# Patient Record
Sex: FEMALE | Race: WHITE | NOT HISPANIC OR LATINO
[De-identification: names, ages, dates, MRNs, and addresses within clinical notes are randomized per-mention and may not be internally consistent; named-entity substitution may affect disease eponyms.]

---

## 2024-10-25 ENCOUNTER — APPOINTMENT (OUTPATIENT)
Dept: NEUROLOGY | Facility: CLINIC | Age: 79
End: 2024-10-25

## 2024-10-25 ENCOUNTER — INPATIENT (INPATIENT)
Facility: HOSPITAL | Age: 79
LOS: 3 days | Discharge: HOME HEALTH | End: 2024-10-29
Attending: HOSPITALIST | Admitting: STUDENT IN AN ORGANIZED HEALTH CARE EDUCATION/TRAINING PROGRAM
Payer: MEDICARE

## 2024-10-25 VITALS
TEMPERATURE: 98 F | RESPIRATION RATE: 16 BRPM | OXYGEN SATURATION: 94 % | SYSTOLIC BLOOD PRESSURE: 106 MMHG | WEIGHT: 139.99 LBS | DIASTOLIC BLOOD PRESSURE: 73 MMHG | HEART RATE: 73 BPM

## 2024-10-25 DIAGNOSIS — F03.90 UNSPECIFIED DEMENTIA, UNSPECIFIED SEVERITY, WITHOUT BEHAVIORAL DISTURBANCE, PSYCHOTIC DISTURBANCE, MOOD DISTURBANCE, AND ANXIETY: ICD-10-CM

## 2024-10-25 DIAGNOSIS — I48.20 CHRONIC ATRIAL FIBRILLATION, UNSPECIFIED: ICD-10-CM

## 2024-10-25 DIAGNOSIS — E03.9 HYPOTHYROIDISM, UNSPECIFIED: ICD-10-CM

## 2024-10-25 DIAGNOSIS — N17.9 ACUTE KIDNEY FAILURE, UNSPECIFIED: ICD-10-CM

## 2024-10-25 DIAGNOSIS — N39.0 URINARY TRACT INFECTION, SITE NOT SPECIFIED: ICD-10-CM

## 2024-10-25 DIAGNOSIS — D75.89 OTHER SPECIFIED DISEASES OF BLOOD AND BLOOD-FORMING ORGANS: ICD-10-CM

## 2024-10-25 DIAGNOSIS — Z29.9 ENCOUNTER FOR PROPHYLACTIC MEASURES, UNSPECIFIED: ICD-10-CM

## 2024-10-25 DIAGNOSIS — I10 ESSENTIAL (PRIMARY) HYPERTENSION: ICD-10-CM

## 2024-10-25 DIAGNOSIS — M79.671 PAIN IN RIGHT FOOT: ICD-10-CM

## 2024-10-25 DIAGNOSIS — D46.9 MYELODYSPLASTIC SYNDROME, UNSPECIFIED: ICD-10-CM

## 2024-10-25 PROBLEM — I48.91 UNSPECIFIED ATRIAL FIBRILLATION: Chronic | Status: ACTIVE | Noted: 2024-03-23

## 2024-10-25 LAB
ALBUMIN SERPL ELPH-MCNC: 3.6 G/DL — SIGNIFICANT CHANGE UP (ref 3.4–5)
ALP SERPL-CCNC: 95 U/L — SIGNIFICANT CHANGE UP (ref 40–120)
ALT FLD-CCNC: 13 U/L — SIGNIFICANT CHANGE UP (ref 12–42)
ANION GAP SERPL CALC-SCNC: 8 MMOL/L — LOW (ref 9–16)
ANISOCYTOSIS BLD QL: SIGNIFICANT CHANGE UP
APPEARANCE UR: CLEAR — SIGNIFICANT CHANGE UP
AST SERPL-CCNC: 13 U/L — LOW (ref 15–37)
BACTERIA # UR AUTO: ABNORMAL /HPF
BASOPHILS # BLD AUTO: 0.06 K/UL — SIGNIFICANT CHANGE UP (ref 0–0.2)
BASOPHILS NFR BLD AUTO: 0.3 % — SIGNIFICANT CHANGE UP (ref 0–2)
BILIRUB SERPL-MCNC: 1.3 MG/DL — HIGH (ref 0.2–1.2)
BILIRUB UR-MCNC: NEGATIVE — SIGNIFICANT CHANGE UP
BUN SERPL-MCNC: 26 MG/DL — HIGH (ref 7–23)
CALCIUM SERPL-MCNC: 9 MG/DL — SIGNIFICANT CHANGE UP (ref 8.5–10.5)
CHLORIDE SERPL-SCNC: 101 MMOL/L — SIGNIFICANT CHANGE UP (ref 96–108)
CO2 SERPL-SCNC: 27 MMOL/L — SIGNIFICANT CHANGE UP (ref 22–31)
COLOR SPEC: YELLOW — SIGNIFICANT CHANGE UP
CREAT SERPL-MCNC: 1.69 MG/DL — HIGH (ref 0.5–1.3)
DIFF PNL FLD: ABNORMAL
EGFR: 30 ML/MIN/1.73M2 — LOW
EOSINOPHIL # BLD AUTO: 0.06 K/UL — SIGNIFICANT CHANGE UP (ref 0–0.5)
EOSINOPHIL NFR BLD AUTO: 0.3 % — SIGNIFICANT CHANGE UP (ref 0–6)
EPI CELLS # UR: PRESENT
GLUCOSE SERPL-MCNC: 112 MG/DL — HIGH (ref 70–99)
GLUCOSE UR QL: NEGATIVE MG/DL — SIGNIFICANT CHANGE UP
HCT VFR BLD CALC: 41.9 % — SIGNIFICANT CHANGE UP (ref 34.5–45)
HGB BLD-MCNC: 14.3 G/DL — SIGNIFICANT CHANGE UP (ref 11.5–15.5)
IMM GRANULOCYTES NFR BLD AUTO: 0.7 % — SIGNIFICANT CHANGE UP (ref 0–0.9)
KETONES UR-MCNC: 15 MG/DL
LACTATE BLDV-MCNC: 1.7 MMOL/L — SIGNIFICANT CHANGE UP (ref 0.5–2)
LEUKOCYTE ESTERASE UR-ACNC: ABNORMAL
LYMPHOCYTES # BLD AUTO: 0.9 K/UL — LOW (ref 1–3.3)
LYMPHOCYTES # BLD AUTO: 4.6 % — LOW (ref 13–44)
MACROCYTES BLD QL: SIGNIFICANT CHANGE UP
MANUAL SMEAR VERIFICATION: SIGNIFICANT CHANGE UP
MCHC RBC-ENTMCNC: 34.1 GM/DL — SIGNIFICANT CHANGE UP (ref 32–36)
MCHC RBC-ENTMCNC: 37.7 PG — HIGH (ref 27–34)
MCV RBC AUTO: 110.6 FL — HIGH (ref 80–100)
MONOCYTES # BLD AUTO: 1.05 K/UL — HIGH (ref 0–0.9)
MONOCYTES NFR BLD AUTO: 5.3 % — SIGNIFICANT CHANGE UP (ref 2–14)
NEUTROPHILS # BLD AUTO: 17.55 K/UL — HIGH (ref 1.8–7.4)
NEUTROPHILS NFR BLD AUTO: 88.8 % — HIGH (ref 43–77)
NITRITE UR-MCNC: POSITIVE
NRBC # BLD: 0 /100 WBCS — SIGNIFICANT CHANGE UP (ref 0–0)
PH UR: 5.5 — SIGNIFICANT CHANGE UP (ref 5–8)
PLAT MORPH BLD: NORMAL — SIGNIFICANT CHANGE UP
PLATELET # BLD AUTO: 301 K/UL — SIGNIFICANT CHANGE UP (ref 150–400)
POTASSIUM SERPL-MCNC: 4.5 MMOL/L — SIGNIFICANT CHANGE UP (ref 3.5–5.3)
POTASSIUM SERPL-SCNC: 4.5 MMOL/L — SIGNIFICANT CHANGE UP (ref 3.5–5.3)
PROT SERPL-MCNC: 7.1 G/DL — SIGNIFICANT CHANGE UP (ref 6.4–8.2)
PROT UR-MCNC: 100 MG/DL
RBC # BLD: 3.79 M/UL — LOW (ref 3.8–5.2)
RBC # FLD: 18.4 % — HIGH (ref 10.3–14.5)
RBC BLD AUTO: ABNORMAL
RBC CASTS # UR COMP ASSIST: 3 /HPF — SIGNIFICANT CHANGE UP (ref 0–4)
SODIUM SERPL-SCNC: 136 MMOL/L — SIGNIFICANT CHANGE UP (ref 132–145)
SP GR SPEC: 1.01 — SIGNIFICANT CHANGE UP (ref 1–1.03)
UROBILINOGEN FLD QL: 0.2 MG/DL — SIGNIFICANT CHANGE UP (ref 0.2–1)
WBC # BLD: 19.76 K/UL — HIGH (ref 3.8–10.5)
WBC # FLD AUTO: 19.76 K/UL — HIGH (ref 3.8–10.5)
WBC UR QL: 99 /HPF — HIGH (ref 0–5)

## 2024-10-25 PROCEDURE — 99285 EMERGENCY DEPT VISIT HI MDM: CPT

## 2024-10-25 PROCEDURE — 99223 1ST HOSP IP/OBS HIGH 75: CPT | Mod: GC

## 2024-10-25 PROCEDURE — 71045 X-RAY EXAM CHEST 1 VIEW: CPT | Mod: 26

## 2024-10-25 RX ORDER — MELATONIN 5 MG
3 TABLET ORAL AT BEDTIME
Refills: 0 | Status: DISCONTINUED | OUTPATIENT
Start: 2024-10-25 | End: 2024-10-29

## 2024-10-25 RX ORDER — MELATONIN 5 MG
1 TABLET ORAL
Refills: 0 | DISCHARGE

## 2024-10-25 RX ORDER — HYDROXYUREA 500 MG
500 CAPSULE ORAL
Refills: 0 | Status: DISCONTINUED | OUTPATIENT
Start: 2024-10-25 | End: 2024-10-29

## 2024-10-25 RX ORDER — SODIUM CHLORIDE 9 MG/ML
1000 INJECTION, SOLUTION INTRAMUSCULAR; INTRAVENOUS; SUBCUTANEOUS ONCE
Refills: 0 | Status: COMPLETED | OUTPATIENT
Start: 2024-10-25 | End: 2024-10-25

## 2024-10-25 RX ORDER — CEFTRIAXONE SODIUM 10 G
1000 VIAL (EA) INJECTION ONCE
Refills: 0 | Status: COMPLETED | OUTPATIENT
Start: 2024-10-25 | End: 2024-10-25

## 2024-10-25 RX ORDER — ACETAMINOPHEN 500 MG
1000 TABLET ORAL EVERY 8 HOURS
Refills: 0 | Status: DISCONTINUED | OUTPATIENT
Start: 2024-10-25 | End: 2024-10-29

## 2024-10-25 RX ORDER — RIVAROXABAN 20 MG/1
1 TABLET, FILM COATED ORAL
Refills: 0 | DISCHARGE

## 2024-10-25 RX ORDER — LEVOTHYROXINE SODIUM 88 MCG
88 TABLET ORAL DAILY
Refills: 0 | Status: DISCONTINUED | OUTPATIENT
Start: 2024-10-25 | End: 2024-10-29

## 2024-10-25 RX ORDER — RIVAROXABAN 20 MG/1
15 TABLET, FILM COATED ORAL
Refills: 0 | Status: DISCONTINUED | OUTPATIENT
Start: 2024-10-25 | End: 2024-10-29

## 2024-10-25 RX ORDER — NIFEDIPINE 90 MG
1 TABLET, EXTENDED RELEASE 24 HR ORAL
Refills: 0 | DISCHARGE

## 2024-10-25 RX ORDER — ACETAMINOPHEN 500 MG
650 TABLET ORAL ONCE
Refills: 0 | Status: COMPLETED | OUTPATIENT
Start: 2024-10-25 | End: 2024-10-25

## 2024-10-25 RX ORDER — NIFEDIPINE 90 MG
30 TABLET, EXTENDED RELEASE 24 HR ORAL DAILY
Refills: 0 | Status: DISCONTINUED | OUTPATIENT
Start: 2024-10-25 | End: 2024-10-26

## 2024-10-25 RX ORDER — QUETIAPINE FUMARATE 200 MG/1
25 TABLET ORAL AT BEDTIME
Refills: 0 | Status: DISCONTINUED | OUTPATIENT
Start: 2024-10-25 | End: 2024-10-29

## 2024-10-25 RX ORDER — ONDANSETRON HYDROCHLORIDE 2 MG/ML
4 INJECTION, SOLUTION INTRAMUSCULAR; INTRAVENOUS EVERY 8 HOURS
Refills: 0 | Status: DISCONTINUED | OUTPATIENT
Start: 2024-10-25 | End: 2024-10-29

## 2024-10-25 RX ORDER — CEFTRIAXONE SODIUM 10 G
2000 VIAL (EA) INJECTION EVERY 24 HOURS
Refills: 0 | Status: DISCONTINUED | OUTPATIENT
Start: 2024-10-26 | End: 2024-10-26

## 2024-10-25 RX ORDER — METOPROLOL TARTRATE 50 MG
100 TABLET ORAL DAILY
Refills: 0 | Status: DISCONTINUED | OUTPATIENT
Start: 2024-10-25 | End: 2024-10-26

## 2024-10-25 RX ORDER — MAGNESIUM, ALUMINUM HYDROXIDE 200-200 MG
30 TABLET,CHEWABLE ORAL EVERY 4 HOURS
Refills: 0 | Status: DISCONTINUED | OUTPATIENT
Start: 2024-10-25 | End: 2024-10-29

## 2024-10-25 RX ORDER — CEFTRIAXONE SODIUM 10 G
1000 VIAL (EA) INJECTION ONCE
Refills: 0 | Status: DISCONTINUED | OUTPATIENT
Start: 2024-10-25 | End: 2024-10-26

## 2024-10-25 RX ADMIN — Medication 1000 MILLIGRAM(S): at 12:59

## 2024-10-25 RX ADMIN — RIVAROXABAN 15 MILLIGRAM(S): 20 TABLET, FILM COATED ORAL at 21:57

## 2024-10-25 RX ADMIN — SODIUM CHLORIDE 1000 MILLILITER(S): 9 INJECTION, SOLUTION INTRAMUSCULAR; INTRAVENOUS; SUBCUTANEOUS at 11:16

## 2024-10-25 RX ADMIN — Medication 100 MILLIGRAM(S): at 12:29

## 2024-10-25 RX ADMIN — Medication 3 MILLIGRAM(S): at 21:56

## 2024-10-25 RX ADMIN — SODIUM CHLORIDE 2000 MILLILITER(S): 9 INJECTION, SOLUTION INTRAMUSCULAR; INTRAVENOUS; SUBCUTANEOUS at 15:16

## 2024-10-25 RX ADMIN — Medication 650 MILLIGRAM(S): at 12:28

## 2024-10-25 RX ADMIN — Medication 650 MILLIGRAM(S): at 15:11

## 2024-10-25 RX ADMIN — SODIUM CHLORIDE 1000 MILLILITER(S): 9 INJECTION, SOLUTION INTRAMUSCULAR; INTRAVENOUS; SUBCUTANEOUS at 12:33

## 2024-10-25 RX ADMIN — QUETIAPINE FUMARATE 25 MILLIGRAM(S): 200 TABLET ORAL at 21:57

## 2024-10-25 NOTE — H&P ADULT - PROBLEM SELECTOR PLAN 3
home medication(s): xarelto 15mg qd, metoprolol 100qd    - continue home medication(s) plan as above

## 2024-10-25 NOTE — H&P ADULT - PROBLEM SELECTOR PLAN 12
F: none  E: replete as needed  N: dash diet  VTE ppx: heparin sq  GI ppx: none  Dispo: RMF  Code status: DNR/DNI

## 2024-10-25 NOTE — H&P ADULT - PROBLEM SELECTOR PLAN 11
chronic, trialed on gabapentin, recently referred to podiatry (unclear whether pt has been able to follow up)  home medication(s): tylenol 1000mg bid    - continue tylenol as above

## 2024-10-25 NOTE — ED PROVIDER NOTE - PHYSICAL EXAMINATION
Gen: NAD. HEENT: NCAT, mmm   Chest: RRR, nl S1 and S2, no m/r/g. Resp: CTAB, no w/r/r  Abd: nl BS, soft, nt/nd. Ext: Warm, dry  Neuro: CN II-XII intact, normal and equal strength, sensation, and reflexes bilaterally, speech clear

## 2024-10-25 NOTE — H&P ADULT - PROBLEM SELECTOR PLAN 10
F: none  E: replete as needed  N: dash diet  VTE ppx: heparin sq  GI ppx: none  Dispo: RMF  Code status: DNR/DNI mri 2022 moderate chronic microvascular ischemic disease, chronic lacunar infarcts in the right enctrum semiovrale and left cerbellar hemisphere  behavioral sx worse at night, insomnia, confusion, aggression  24/7 hha, hcp jesica bourgeois ([phone])  seroquel qhs dose recently increased from 12.5 to 25 qhs w notable improvement in sx above    - continue seroquel 25mg qhs  - delirium precautions (high risk for delirium given advanced age, dementia, cognitive impairment, frailty, history of cns disorders - bedside window with blinds open, frequent re-orientation, minimize lines nighttime awakenings, ensure regular bowel movements w bowel regimen)

## 2024-10-25 NOTE — H&P ADULT - ASSESSMENT
79F PMHx dementia, afib, htn, hypothyroidism, jak2 mds, who presented to ed w [facility] after pt was noted to be febrile at home (not measured). per hha & hcp discussions w ed provider, pt was diagnosed w uti few weeks ago, sp tx w po abx (unable to recall which one). pt herself is unable to communicate sx due to baseline mental status iso dementia. hha reports noting increased fatigue & urinary frequency in the day preceding admission, sx similar to prior instance of uti, no obvious complaints of discomfort. 79F PMHx dementia, afib, htn, hypothyroidism, jak2 mds, who presented to ed w hha after pt was noted to be febrile at home (not measured). per hha & hcp discussions w ed provider, pt was diagnosed w uti few weeks ago, sp tx w po macrobid. pt herself is unable to communicate sx due to baseline mental status iso dementia. hha reports noting increased fatigue & urinary frequency in the day preceding admission, sx similar to prior instance of uti, no obvious complaints of discomfort.

## 2024-10-25 NOTE — H&P ADULT - PROBLEM SELECTOR PLAN 8
mri 2022 moderate chronic microvascular ischemic disease, chronic lacunar infarcts in the right enctrum semiovrale and left cerbellar hemisphere  behavioral sx worse at night, insomnia, confusion, aggression  24/7 hha, hcp jesica bourgeois ([phone])  seroquel qhs dose recently increased from 12.5 to 25 qhs w notable improvement in sx above    - continue seroquel 25mg qhs  - delirium precautions (high risk for delirium given advanced age, dementia, cognitive impairment, frailty, history of cns disorders - bedside window with blinds open, frequent re-orientation, minimize lines nighttime awakenings, ensure regular bowel movements w bowel regimen) Jak2+ myleoproliferative syndrome  follows w dr. kerr every 6mo, recently resumed hydroxyurea  chronic leukocytosis, thrombocytosis (plts wnl at presentation)  home medication(s): hydroxyurea 500mg bid    - continue home medication(s)

## 2024-10-25 NOTE — H&P ADULT - PROBLEM SELECTOR PLAN 9
chronic, trialed on gabapentin, recently referred to podiatry (unclear whether pt has been able to follow up)  home medication(s): tylenol 1000mg bid    - continue tylenol as above mcv 110.6 at presentation, baseline 90  hx of mds, hypothyroidism, elderly w dementia & possible nutrient deficiency    - follow up am tsh, b12, folate

## 2024-10-25 NOTE — H&P ADULT - NSICDXPASTMEDICALHX_GEN_ALL_CORE_FT
PAST MEDICAL HISTORY:  Atrial fibrillation     Atrial fibrillation     Dementia     HTN (hypertension)     Hypertension     Hypothyroid     Hypothyroidism     MDS (myelodysplastic syndrome)

## 2024-10-25 NOTE — H&P ADULT - PROBLEM SELECTOR PLAN 1
per hha, 1d fatigue, increased urinary urgency, febrile overnight (not measured)  recent uti sp po abx, similar presenting sx per hha (unable to recall abx)  sp ctx, 2l ivf in ed  +ua, leukocytosis close to baseline    - ctx 2g qd x3d (10/25 - 10/27)  - follow up urine cx, sensitivities 2/4 sirs criteria (fever, leukocytosis - though, of note, wbc close to baseline), likely source uti  sp ctx 1g in ed, 2l ivf, tylenol  uti sx x2+ weeks, recently completed treatment w macrobid outpatient  bcx collected in ed +gram negative rods - likely source genitourinary tract    - continue ctx 2g qd for now (ordered for 8d course), narrow as appropriate  - follow up ctap  - follow up ucx, sensitivities  - daily surveillance bcx  - hold home metoprolol, nifedipine for now

## 2024-10-25 NOTE — H&P ADULT - NSHPPHYSICALEXAM_GEN_ALL_CORE
General: NAD  HEENT: PERRL, EOM, anicteric sclera, MMM  Cardiovascular: +S1/S2; RRR; no M/R/G  Respiratory: CTAB; no W/R/R  Gastrointestinal: soft, NT/ND; +BS x4  Extremities: WWP; 2+ peripheral pulses bilaterally; no LE edema  Skin: normal color and turgor; no rash  Neurologic: AOx1 General: NAD  HEENT: PERRL, EOM, anicteric sclera, MMM  Cardiovascular: +S1/S2; RRR; no M/R/G  Respiratory: CTAB; no W/R/R  Gastrointestinal: soft, NT/ND; +BS x4; no suprapubic tenderness, no flank tenderness  Extremities: WWP; 2+ peripheral pulses bilaterally; no LE edema  Skin: normal color and turgor; no rash  Neurologic: AOx1

## 2024-10-25 NOTE — H&P ADULT - PROBLEM SELECTOR PLAN 6
Jak2+ myleoproliferative syndrome  follows w dr. kerr every 6mo, recently resumed hydroxyurea  chronic leukocytosis, thrombocytosis (plts wnl at presentation)  home medication(s): hydroxyurea 500mg bid    - continue home medication(s) home medication(s): metoprolol 100mg qd, nifedipine 30mg qd    - hold home medications for now given acute sepsis, resume as appropriate

## 2024-10-25 NOTE — H&P ADULT - PROBLEM SELECTOR PLAN 2
cr at presentation 1.69, baseline around 1, iso acute uti    - continue to trend creatinine, avoid nephrotoxins, renally dose medications plan as above

## 2024-10-25 NOTE — H&P ADULT - ATTENDING COMMENTS
79F PMHx dementia, afib, htn, hypothyroidism, jak2 mds, who presented to ed w [facility] after pt was noted to be febrile at home, admitted for sepsis 2/2 UTI and gram negative bacteremia     #Sepsis   #UTI   #gram negative bacteremia   #GUERRERO  #Dementia     Plan   -c/w Ceftriaxone 2g QD, no hx of MDR UTIs, HDS   -maintenance fluids  -f/up urine cx. bcx speciation. surveillance bcx   -trend cr s/p fluids. f/up bladder scan, urine lytes   -CTAP   -PT/SW  -fall precautions

## 2024-10-25 NOTE — H&P ADULT - PROBLEM SELECTOR PLAN 5
home medication(s): levothyroxine 88mcg - 1 tab tuesday - friday, sunday; 2 tabs monday & saturday    - continue home medication(s) home medication(s): xarelto 15mg qd, metoprolol 100qd    - continue home xarelto, hold metoprolol for now, resume as appropriate home medication(s): xarelto 15mg qd, metoprolol 100qd    - continue home xarelto  - hold metoprolol for now, resume as appropriate

## 2024-10-25 NOTE — ED PROVIDER NOTE - OBJECTIVE STATEMENT
Patient's home health aides report that patient had a UTI last week and was treated with an unknown antibiotic.  Patient developed fever around 2 AM today, not measured.  Patient unable to give review of systems due to dementia.

## 2024-10-25 NOTE — ED PROVIDER NOTE - PROGRESS NOTE DETAILS
Spoke with patient's healthcare proxy Maile Nickerson [phone]. She said patient was treated with antibiotics 3 weeks ago, though she does not know which one. She said patient's wishes are to be DNR/DNI. Patient is resting comfortably, NAD. I consulted with Dr. Hooker who accepted patient to [facility].

## 2024-10-25 NOTE — ED ADULT TRIAGE NOTE - CHIEF COMPLAINT QUOTE
PT bib EMS c/o fever, generalized fatigue, and urinary frequency x yesterday. EMS reports being dx with a UTI last week. PMH HTN, Afib, and dementia.

## 2024-10-25 NOTE — ED ADULT NURSE NOTE - NSFALLRISKINTERV_ED_ALL_ED
Assistance OOB with selected safe patient handling equipment if applicable/Assistance with ambulation/Communicate fall risk and risk factors to all staff, patient, and family/Monitor gait and stability/Monitor for mental status changes and reorient to person, place, and time, as needed/Provide visual cue: yellow wristband, yellow gown, etc/Reinforce activity limits and safety measures with patient and family/Toileting schedule using arm’s reach rule for commode and bathroom/Use of alarms - bed, stretcher, chair and/or video monitoring/Call bell, personal items and telephone in reach/Instruct patient to call for assistance before getting out of bed/chair/stretcher/Non-slip footwear applied when patient is off stretcher/[place] to call system/Physically safe environment - no spills, clutter or unnecessary equipment/Purposeful Proactive Rounding/Room/bathroom lighting operational, light cord in reach

## 2024-10-25 NOTE — H&P ADULT - HISTORY OF PRESENT ILLNESS
79F PMHx dementia, afib, htn, hypothyroidism, jak2 mds, who presented to ed w [facility] after pt was noted to be febrile at home (not measured). per hha & hcp discussions w ed provider, pt was diagnosed w uti few weeks ago, sp tx w po abx (unable to recall which one). pt herself is unable to communicate sx due to baseline mental status iso dementia. hha reports noting increased fatigue & urinary frequency in the day preceding admission, sx similar to prior instance of uti, no obvious complaints of discomfort.    ED Course -  Presenting vitals: 98.3F, 106/73, HR 73, RR 16, spo2 94% on room air  Notable labs: wbc 19.76 (neutrophil-predominant), mcv 110.6, creatinine 1.69, ua + (protein, ketones, moderate leukocyte esterase, nitrite, wbc, bacteria)  Imaging: clear lungs on cxr  Interventions: ctx, tylenol x1, 2l ns 79F PMHx dementia, afib, htn, hypothyroidism, jak2 mds, who presented to ed w [facility]ha after pt was noted to be febrile at home (not measured). per hha & hcp discussions w ed provider, pt was diagnosed w uti few weeks ago, sp tx w macrobid. pt herself is unable to communicate sx due to baseline mental status iso dementia. hha reports noting increased fatigue & urinary frequency in the day preceding admission, sx similar to prior instance of uti, no obvious complaints of discomfort.    ED Course -  Vitals: 102F (Tmax, rectal), 106/73, HR 73, RR 16, spo2 94% on room air  Notable labs: wbc 19.76 (neutrophil-predominant), mcv 110.6, creatinine 1.69, ua + (protein, ketones, moderate leukocyte esterase, nitrite, wbc, bacteria)  Imaging: clear lungs on cxr  Interventions: ctx, tylenol x1, 2l ns

## 2024-10-25 NOTE — H&P ADULT - PROBLEM SELECTOR PLAN 7
mcv 110.6 at presentation, baseline 90  hx of mds, hypothyroidism, elderly w dementia & possible nutrient deficiency    - follow up am tsh, b12, folate home medication(s): levothyroxine 88mcg - 1 tab tuesday - friday, sunday; 2 tabs monday & saturday    - continue home medication(s)

## 2024-10-25 NOTE — H&P ADULT - PROBLEM SELECTOR PLAN 4
home medication(s): metoprolol 100mg qd, nifedipine 30mg qd    - continue home medication(s) cr at presentation 1.69, baseline around 1, iso acute infection  no cva tenderness on exam    - maintenance fluids at 100cc/h x10 hours (to complete 10/26 am)  - continue to trend creatinine, avoid nephrotoxins, renally dose medications  - follow up ctap as above (avoid iv contrast, consider if clarita improved in am & study still pending) cr at presentation 1.69, baseline around 1, likely prerenal vs less-likely intrinsic due to pyelonephritis  no cva tenderness on exam    - maintenance fluids at 100cc/h x10 hours (to complete 10/26 am)  - continue to trend creatinine, avoid nephrotoxins, renally dose medications  - follow up ctap as above (avoid iv contrast, consider if clarita improved in am & study still pending)

## 2024-10-26 DIAGNOSIS — A41.9 SEPSIS, UNSPECIFIED ORGANISM: ICD-10-CM

## 2024-10-26 DIAGNOSIS — R78.81 BACTEREMIA: ICD-10-CM

## 2024-10-26 LAB
ANION GAP SERPL CALC-SCNC: 12 MMOL/L — SIGNIFICANT CHANGE UP (ref 5–17)
APPEARANCE UR: CLEAR — SIGNIFICANT CHANGE UP
BACTERIA # UR AUTO: NEGATIVE /HPF — SIGNIFICANT CHANGE UP
BASOPHILS # BLD AUTO: 0.03 K/UL — SIGNIFICANT CHANGE UP (ref 0–0.2)
BASOPHILS NFR BLD AUTO: 0.2 % — SIGNIFICANT CHANGE UP (ref 0–2)
BILIRUB UR-MCNC: NEGATIVE — SIGNIFICANT CHANGE UP
BUN SERPL-MCNC: 20 MG/DL — SIGNIFICANT CHANGE UP (ref 7–23)
CALCIUM SERPL-MCNC: 8.4 MG/DL — SIGNIFICANT CHANGE UP (ref 8.4–10.5)
CAST: 3 /LPF — SIGNIFICANT CHANGE UP (ref 0–4)
CHLORIDE SERPL-SCNC: 104 MMOL/L — SIGNIFICANT CHANGE UP (ref 96–108)
CO2 SERPL-SCNC: 19 MMOL/L — LOW (ref 22–31)
COLOR SPEC: YELLOW — SIGNIFICANT CHANGE UP
CREAT ?TM UR-MCNC: 108 MG/DL — SIGNIFICANT CHANGE UP
CREAT SERPL-MCNC: 1.35 MG/DL — HIGH (ref 0.5–1.3)
CULTURE RESULTS: SIGNIFICANT CHANGE UP
DIFF PNL FLD: ABNORMAL
E COLI DNA BLD POS QL NAA+NON-PROBE: SIGNIFICANT CHANGE UP
EGFR: 39 ML/MIN/1.73M2 — LOW
EOSINOPHIL # BLD AUTO: 0.01 K/UL — SIGNIFICANT CHANGE UP (ref 0–0.5)
EOSINOPHIL NFR BLD AUTO: 0.1 % — SIGNIFICANT CHANGE UP (ref 0–6)
GLUCOSE SERPL-MCNC: 103 MG/DL — HIGH (ref 70–99)
GLUCOSE UR QL: NEGATIVE MG/DL — SIGNIFICANT CHANGE UP
GRAM STN FLD: ABNORMAL
HCT VFR BLD CALC: 36.9 % — SIGNIFICANT CHANGE UP (ref 34.5–45)
HGB BLD-MCNC: 12.3 G/DL — SIGNIFICANT CHANGE UP (ref 11.5–15.5)
IMM GRANULOCYTES NFR BLD AUTO: 0.7 % — SIGNIFICANT CHANGE UP (ref 0–0.9)
KETONES UR-MCNC: 15 MG/DL
LEUKOCYTE ESTERASE UR-ACNC: ABNORMAL
LYMPHOCYTES # BLD AUTO: 0.74 K/UL — LOW (ref 1–3.3)
LYMPHOCYTES # BLD AUTO: 4.9 % — LOW (ref 13–44)
MAGNESIUM SERPL-MCNC: 2.1 MG/DL — SIGNIFICANT CHANGE UP (ref 1.6–2.6)
MCHC RBC-ENTMCNC: 33.3 GM/DL — SIGNIFICANT CHANGE UP (ref 32–36)
MCHC RBC-ENTMCNC: 37.5 PG — HIGH (ref 27–34)
MCV RBC AUTO: 112.5 FL — HIGH (ref 80–100)
METHOD TYPE: SIGNIFICANT CHANGE UP
MONOCYTES # BLD AUTO: 1.37 K/UL — HIGH (ref 0–0.9)
MONOCYTES NFR BLD AUTO: 9.2 % — SIGNIFICANT CHANGE UP (ref 2–14)
NEUTROPHILS # BLD AUTO: 12.7 K/UL — HIGH (ref 1.8–7.4)
NEUTROPHILS NFR BLD AUTO: 84.9 % — HIGH (ref 43–77)
NITRITE UR-MCNC: NEGATIVE — SIGNIFICANT CHANGE UP
NRBC # BLD: 0 /100 WBCS — SIGNIFICANT CHANGE UP (ref 0–0)
OSMOLALITY UR: 476 MOSM/KG — SIGNIFICANT CHANGE UP (ref 300–900)
PH UR: 6 — SIGNIFICANT CHANGE UP (ref 5–8)
PHOSPHATE SERPL-MCNC: 2.6 MG/DL — SIGNIFICANT CHANGE UP (ref 2.5–4.5)
PLATELET # BLD AUTO: 223 K/UL — SIGNIFICANT CHANGE UP (ref 150–400)
POTASSIUM SERPL-MCNC: 4 MMOL/L — SIGNIFICANT CHANGE UP (ref 3.5–5.3)
POTASSIUM SERPL-SCNC: 4 MMOL/L — SIGNIFICANT CHANGE UP (ref 3.5–5.3)
POTASSIUM UR-SCNC: 31 MMOL/L — SIGNIFICANT CHANGE UP
PROT ?TM UR-MCNC: 70 MG/DL — HIGH (ref 0–12)
PROT UR-MCNC: 100 MG/DL
PROT/CREAT UR-RTO: 0.6 RATIO — HIGH (ref 0–0.2)
RBC # BLD: 3.28 M/UL — LOW (ref 3.8–5.2)
RBC # FLD: 18.6 % — HIGH (ref 10.3–14.5)
RBC CASTS # UR COMP ASSIST: 1 /HPF — SIGNIFICANT CHANGE UP (ref 0–4)
SODIUM SERPL-SCNC: 135 MMOL/L — SIGNIFICANT CHANGE UP (ref 135–145)
SODIUM UR-SCNC: 63 MMOL/L — SIGNIFICANT CHANGE UP
SP GR SPEC: 1.02 — SIGNIFICANT CHANGE UP (ref 1–1.03)
SPECIMEN SOURCE: SIGNIFICANT CHANGE UP
SQUAMOUS # UR AUTO: 2 /HPF — SIGNIFICANT CHANGE UP (ref 0–5)
TSH SERPL-MCNC: 1.29 UIU/ML — SIGNIFICANT CHANGE UP (ref 0.27–4.2)
UROBILINOGEN FLD QL: 1 MG/DL — SIGNIFICANT CHANGE UP (ref 0.2–1)
UUN UR-MCNC: 704 MG/DL — SIGNIFICANT CHANGE UP
WBC # BLD: 14.96 K/UL — HIGH (ref 3.8–10.5)
WBC # FLD AUTO: 14.96 K/UL — HIGH (ref 3.8–10.5)
WBC UR QL: 44 /HPF — HIGH (ref 0–5)

## 2024-10-26 PROCEDURE — 99233 SBSQ HOSP IP/OBS HIGH 50: CPT | Mod: GC

## 2024-10-26 PROCEDURE — 74177 CT ABD & PELVIS W/CONTRAST: CPT | Mod: 26

## 2024-10-26 RX ORDER — METOPROLOL TARTRATE 50 MG
1 TABLET ORAL
Refills: 0 | DISCHARGE

## 2024-10-26 RX ORDER — PIPERACILLIN AND TAZOBACTAM .5; 4 G/20ML; G/20ML
3.38 INJECTION, POWDER, LYOPHILIZED, FOR SOLUTION INTRAVENOUS ONCE
Refills: 0 | Status: DISCONTINUED | OUTPATIENT
Start: 2024-10-26 | End: 2024-10-26

## 2024-10-26 RX ORDER — PIPERACILLIN AND TAZOBACTAM .5; 4 G/20ML; G/20ML
3.38 INJECTION, POWDER, LYOPHILIZED, FOR SOLUTION INTRAVENOUS EVERY 8 HOURS
Refills: 0 | Status: DISCONTINUED | OUTPATIENT
Start: 2024-10-26 | End: 2024-10-26

## 2024-10-26 RX ORDER — METOPROLOL TARTRATE 50 MG
100 TABLET ORAL DAILY
Refills: 0 | Status: DISCONTINUED | OUTPATIENT
Start: 2024-10-26 | End: 2024-10-29

## 2024-10-26 RX ORDER — CEFTRIAXONE SODIUM 10 G
2000 VIAL (EA) INJECTION EVERY 24 HOURS
Refills: 0 | Status: DISCONTINUED | OUTPATIENT
Start: 2024-10-27 | End: 2024-10-29

## 2024-10-26 RX ORDER — SODIUM CHLORIDE 9 MG/ML
1000 INJECTION, SOLUTION INTRAMUSCULAR; INTRAVENOUS; SUBCUTANEOUS
Refills: 0 | Status: DISCONTINUED | OUTPATIENT
Start: 2024-10-26 | End: 2024-10-26

## 2024-10-26 RX ADMIN — Medication 500 MILLIGRAM(S): at 17:22

## 2024-10-26 RX ADMIN — Medication 88 MICROGRAM(S): at 06:26

## 2024-10-26 RX ADMIN — Medication 125 MILLILITER(S): at 12:48

## 2024-10-26 RX ADMIN — RIVAROXABAN 15 MILLIGRAM(S): 20 TABLET, FILM COATED ORAL at 17:21

## 2024-10-26 RX ADMIN — Medication 100 MILLIGRAM(S): at 14:03

## 2024-10-26 RX ADMIN — SODIUM CHLORIDE 100 MILLILITER(S): 9 INJECTION, SOLUTION INTRAMUSCULAR; INTRAVENOUS; SUBCUTANEOUS at 09:35

## 2024-10-26 NOTE — PROGRESS NOTE ADULT - PROBLEM SELECTOR PLAN 1
2/4 sirs criteria (fever, leukocytosis - though, of note, wbc close to baseline), likely source uti  sp ctx 1g in ed, 2l ivf, tylenol  uti sx x2+ weeks, recently completed treatment w macrobid outpatient  bcx collected in ed +gram negative rods - likely source genitourinary tract    - continue ctx 2g qd for now (ordered for 8d course), narrow as appropriate  - follow up ctap  - follow up ucx, sensitivities  - daily surveillance bcx  - hold home metoprolol, nifedipine for now 2/4 sirs criteria (fever, leukocytosis - though, of note, wbc close to baseline), likely source uti  sp ctx 1g in ed, 2l ivf, tylenol  uti sx x2+ weeks, recently completed treatment w macrobid outpatient  bcx collected in ed +gram negative rods - likely source genitourinary tract    - continue ctx 2g qd for now (ordered for 8d course), narrow as appropriate  - follow up ctap (w/ IV con to assess better for infxn)  - follow up ucx, sensitivities  - daily surveillance bcx  - hold home nifedipine for now

## 2024-10-26 NOTE — PROGRESS NOTE ADULT - NS ATTEST RISK PROBLEM GEN_ALL_CORE FT
Management of severe sepsis ,monitoring blood cx , IV antibiotics , restarting metoprolol, ordering CT scan  , treating GUERRERO , UTI  and Bacteremia

## 2024-10-26 NOTE — PROGRESS NOTE ADULT - SUBJECTIVE AND OBJECTIVE BOX
Patient is a 79y old  Female who presents with a chief complaint of UTI (25 Oct 2024 21:30)      INTERVAL HPI/OVERNIGHT EVENTS:       SUBJECTIVE:      Vital Signs Last 24 Hrs  T(C): 36.9 (26 Oct 2024 05:41), Max: 38.9 (25 Oct 2024 11:51)  T(F): 98.5 (26 Oct 2024 05:41), Max: 102 (25 Oct 2024 11:51)  HR: 105 (26 Oct 2024 05:41) (95 - 118)  BP: 111/65 (26 Oct 2024 05:41) (102/67 - 133/74)  BP(mean): --  ABP: --  ABP(mean): --  RR: 18 (26 Oct 2024 05:41) (16 - 18)  SpO2: 95% (26 Oct 2024 05:41) (95% - 97%)    O2 Parameters below as of 26 Oct 2024 05:41  Patient On (Oxygen Delivery Method): room air        PHYSICAL EXAM:  GENERAL:   HEAD:  Atraumatic, Normocephalic  EYES: EOMI, PERRLA, conjunctiva and sclera clear  NECK: Supple, No JVD, Normal thyroid, no enlarged nodes  NERVOUS SYSTEM:  Alert & Awake.   CHEST/LUNG: B/L good air entry; No rales, rhonchi, or wheezing  HEART: S1S2 normal, no S3, Regular rate and rhythm; No murmurs  ABDOMEN: Soft, Nontender, Nondistended; Bowel sounds present  EXTREMITIES:  2+ Peripheral Pulses, No clubbing, cyanosis, or edema  LYMPH: No lymphadenopathy noted  SKIN: No rashes or lesions        LABS:                        12.3   14.96 )-----------( 223      ( 26 Oct 2024 07:06 )             36.9     10    135  |  104  |  20  ----------------------------<  103[H]  4.0   |  19[L]  |  1.35[H]    Ca    8.4      26 Oct 2024 07:06  Phos  2.6     10  Mg     2.1     10-26    TPro  7.1  /  Alb  3.6  /  TBili  1.3[H]  /  DBili  x   /  AST  13[L]  /  ALT  13  /  AlkPhos  95  10-25      Urinalysis Basic - ( 26 Oct 2024 11:03 )    Color: Yellow / Appearance: Clear / S.017 / pH: x  Gluc: x / Ketone: 15 mg/dL  / Bili: Negative / Urobili: 1.0 mg/dL   Blood: x / Protein: 100 mg/dL / Nitrite: Negative   Leuk Esterase: Moderate / RBC: 1 /HPF / WBC 44 /HPF   Sq Epi: x / Non Sq Epi: 2 /HPF / Bacteria: Negative /HPF      CAPILLARY BLOOD GLUCOSE            RADIOLOGY & ADDITIONAL TESTS:    Consultant(s) Notes Reviewed:  [x ] YES  [ ] NO    MEDICATIONS  (STANDING):  hydroxyurea 500 milliGRAM(s) Oral two times a day  levothyroxine 88 MICROGram(s) Oral daily  melatonin 3 milliGRAM(s) Oral at bedtime  QUEtiapine 25 milliGRAM(s) Oral at bedtime  rivaroxaban 15 milliGRAM(s) Oral with dinner  sodium chloride 0.9%. 1000 milliLiter(s) (100 mL/Hr) IV Continuous <Continuous>    MEDICATIONS  (PRN):  acetaminophen     Tablet .. 1000 milliGRAM(s) Oral every 8 hours PRN Temp greater or equal to 38C (100.4F), Mild Pain (1 - 3)  aluminum hydroxide/magnesium hydroxide/simethicone Suspension 30 milliLiter(s) Oral every 4 hours PRN Dyspepsia  ondansetron Injectable 4 milliGRAM(s) IV Push every 8 hours PRN Nausea and/or Vomiting        Care Discussed with Consultants/Other Providers [ x] YES  [ ] NO Patient is a 79y old  Female who presents with a chief complaint of UTI (25 Oct 2024 21:30)      INTERVAL HPI/OVERNIGHT EVENTS:       SUBJECTIVE:      Vital Signs Last 24 Hrs  T(C): 36.9 (26 Oct 2024 05:41), Max: 38.9 (25 Oct 2024 11:51)  T(F): 98.5 (26 Oct 2024 05:41), Max: 102 (25 Oct 2024 11:51)  HR: 105 (26 Oct 2024 05:41) (95 - 118)  BP: 111/65 (26 Oct 2024 05:41) (102/67 - 133/74)  BP(mean): --  ABP: --  ABP(mean): --  RR: 18 (26 Oct 2024 05:41) (16 - 18)  SpO2: 95% (26 Oct 2024 05:41) (95% - 97%)    O2 Parameters below as of 26 Oct 2024 05:41  Patient On (Oxygen Delivery Method): room air        PHYSICAL EXAM:  GENERAL:   HEAD:  Atraumatic, Normocephalic  EYES: EOMI, PERRLA, conjunctiva and sclera clear  NECK: Supple, No JVD, Normal thyroid, no enlarged nodes  NERVOUS SYSTEM:  Alert & Awake. oriented to person and place, she was able to tell me she is in the hospital   CHEST/LUNG: B/L good air entry; No rales, rhonchi, or wheezing  HEART: S1S2 normal, no S3, Regular rate and rhythm; No murmurs  ABDOMEN: Soft, Nontender, Nondistended; Bowel sounds present  EXTREMITIES:  2+ Peripheral Pulses, No clubbing, cyanosis, or edema  LYMPH: No lymphadenopathy noted  SKIN: No rashes or lesions        LABS:                        12.3   14.96 )-----------( 223      ( 26 Oct 2024 07:06 )             36.9     10-    135  |  104  |  20  ----------------------------<  103[H]  4.0   |  19[L]  |  1.35[H]    Ca    8.4      26 Oct 2024 07:06  Phos  2.6     10  Mg     2.1     10-26    TPro  7.1  /  Alb  3.6  /  TBili  1.3[H]  /  DBili  x   /  AST  13[L]  /  ALT  13  /  AlkPhos  95  10-25      Urinalysis Basic - ( 26 Oct 2024 11:03 )    Color: Yellow / Appearance: Clear / S.017 / pH: x  Gluc: x / Ketone: 15 mg/dL  / Bili: Negative / Urobili: 1.0 mg/dL   Blood: x / Protein: 100 mg/dL / Nitrite: Negative   Leuk Esterase: Moderate / RBC: 1 /HPF / WBC 44 /HPF   Sq Epi: x / Non Sq Epi: 2 /HPF / Bacteria: Negative /HPF      CAPILLARY BLOOD GLUCOSE            RADIOLOGY & ADDITIONAL TESTS:    Consultant(s) Notes Reviewed:  [x ] YES  [ ] NO    MEDICATIONS  (STANDING):  hydroxyurea 500 milliGRAM(s) Oral two times a day  levothyroxine 88 MICROGram(s) Oral daily  melatonin 3 milliGRAM(s) Oral at bedtime  QUEtiapine 25 milliGRAM(s) Oral at bedtime  rivaroxaban 15 milliGRAM(s) Oral with dinner  sodium chloride 0.9%. 1000 milliLiter(s) (100 mL/Hr) IV Continuous <Continuous>    MEDICATIONS  (PRN):  acetaminophen     Tablet .. 1000 milliGRAM(s) Oral every 8 hours PRN Temp greater or equal to 38C (100.4F), Mild Pain (1 - 3)  aluminum hydroxide/magnesium hydroxide/simethicone Suspension 30 milliLiter(s) Oral every 4 hours PRN Dyspepsia  ondansetron Injectable 4 milliGRAM(s) IV Push every 8 hours PRN Nausea and/or Vomiting        Care Discussed with Consultants/Other Providers [ x] YES  [ ] NO Patient is a 79y old  Female who presents with a chief complaint of UTI (25 Oct 2024 21:30)      INTERVAL HPI/OVERNIGHT EVENTS:   NAEO.    SUBJECTIVE:  Patient seen and examined at bedside. Calm and alert but only oriented to person. No acute complaints.    Vital Signs Last 24 Hrs  T(C): 36.9 (26 Oct 2024 05:41), Max: 38.9 (25 Oct 2024 11:51)  T(F): 98.5 (26 Oct 2024 05:41), Max: 102 (25 Oct 2024 11:51)  HR: 105 (26 Oct 2024 05:41) (95 - 118)  BP: 111/65 (26 Oct 2024 05:41) (102/67 - 133/74)  BP(mean): --  ABP: --  ABP(mean): --  RR: 18 (26 Oct 2024 05:41) (16 - 18)  SpO2: 95% (26 Oct 2024 05:41) (95% - 97%)    O2 Parameters below as of 26 Oct 2024 05:41  Patient On (Oxygen Delivery Method): room air        PHYSICAL EXAM:  GENERAL: NAD  HEAD:  Atraumatic, Normocephalic  EYES: EOMI, PERRLA, conjunctiva and sclera clear  NECK: Supple, No JVD.  NERVOUS SYSTEM:  Alert & Awake. Oriented to person, somewhat oriented to place (says she's in hospital, but not which), not oriented to time.   CHEST/LUNG: B/L good air entry; No rales, rhonchi, or wheezing  HEART: S1S2 normal. Regular rate and rhythm.  ABDOMEN: Soft, Nondistended; mildly tender in suprapubic region. Bowel sounds present  EXTREMITIES:  2+ Peripheral Pulses, trace LE edema  LYMPH: No lymphadenopathy noted  SKIN: No grossly apparent rashes or lesions        LABS:                        12.3   14.96 )-----------( 223      ( 26 Oct 2024 07:06 )             36.9     10-    135  |  104  |  20  ----------------------------<  103[H]  4.0   |  19[L]  |  1.35[H]    Ca    8.4      26 Oct 2024 07:06  Phos  2.6     10-  Mg     2.1     10-26    TPro  7.1  /  Alb  3.6  /  TBili  1.3[H]  /  DBili  x   /  AST  13[L]  /  ALT  13  /  AlkPhos  95  10-25      Urinalysis Basic - ( 26 Oct 2024 11:03 )    Color: Yellow / Appearance: Clear / S.017 / pH: x  Gluc: x / Ketone: 15 mg/dL  / Bili: Negative / Urobili: 1.0 mg/dL   Blood: x / Protein: 100 mg/dL / Nitrite: Negative   Leuk Esterase: Moderate / RBC: 1 /HPF / WBC 44 /HPF   Sq Epi: x / Non Sq Epi: 2 /HPF / Bacteria: Negative /HPF      CAPILLARY BLOOD GLUCOSE            RADIOLOGY & ADDITIONAL TESTS:    Consultant(s) Notes Reviewed:  [x ] YES  [ ] NO    MEDICATIONS  (STANDING):  hydroxyurea 500 milliGRAM(s) Oral two times a day  levothyroxine 88 MICROGram(s) Oral daily  melatonin 3 milliGRAM(s) Oral at bedtime  QUEtiapine 25 milliGRAM(s) Oral at bedtime  rivaroxaban 15 milliGRAM(s) Oral with dinner  sodium chloride 0.9%. 1000 milliLiter(s) (100 mL/Hr) IV Continuous <Continuous>    MEDICATIONS  (PRN):  acetaminophen     Tablet .. 1000 milliGRAM(s) Oral every 8 hours PRN Temp greater or equal to 38C (100.4F), Mild Pain (1 - 3)  aluminum hydroxide/magnesium hydroxide/simethicone Suspension 30 milliLiter(s) Oral every 4 hours PRN Dyspepsia  ondansetron Injectable 4 milliGRAM(s) IV Push every 8 hours PRN Nausea and/or Vomiting        Care Discussed with Consultants/Other Providers [ x] YES  [ ] NO

## 2024-10-26 NOTE — PROGRESS NOTE ADULT - PROBLEM SELECTOR PLAN 6
home medication(s): metoprolol 100mg qd, nifedipine 30mg qd    - hold home medications for now given acute sepsis, resume as appropriate home medication(s): metoprolol tartrate 100mg qd, nifedipine 30mg qd    - hold home nifedipine for now given acute sepsis, resume as appropriate  - restarted home metoprolol given afib w/ intermittent rvr

## 2024-10-26 NOTE — PROGRESS NOTE ADULT - PROBLEM SELECTOR PLAN 5
home medication(s): xarelto 15mg qd, metoprolol 100qd    - continue home xarelto  - hold metoprolol for now, resume as appropriate home medication(s): xarelto 15mg qd, metoprolol tartrate 100qd    - continue home xarelto  - resuming home metoprolol tartrate 100 qd with hold parameters

## 2024-10-26 NOTE — PROGRESS NOTE ADULT - PROBLEM SELECTOR PLAN 4
cr at presentation 1.69, baseline around 1, likely prerenal vs less-likely intrinsic due to pyelonephritis  no cva tenderness on exam    - maintenance fluids at 100cc/h x10 hours (to complete 10/26 am)  - continue to trend creatinine, avoid nephrotoxins, renally dose medications  - follow up ctap as above (avoid iv contrast, consider if clarita improved in am & study still pending) cr at presentation 1.69, baseline around 1, likely prerenal vs less-likely intrinsic due to pyelonephritis  no cva tenderness on exam    - maintenance fluids at 100cc/h x10 hours (to complete 10/26 am) --> switched to LR   - continue to trend creatinine, avoid nephrotoxins, renally dose medications  - follow up ctap as above (GFR improved, switched to w/ IV con to better asses infxn cr at presentation 1.69, baseline around 1, likely prerenal vs less-likely intrinsic due to pyelonephritis  no cva tenderness on exam    - maintenance fluids at 100cc/h x10 hours (to complete 10/26 am) --> switched to LR   - continue to trend creatinine, avoid nephrotoxins, renally dose medications  - f/u Urine studies  - follow up ctap as above (GFR improved, switched to w/ IV con to better asses infxn

## 2024-10-27 LAB
-  AMPICILLIN/SULBACTAM: SIGNIFICANT CHANGE UP
-  AMPICILLIN: SIGNIFICANT CHANGE UP
-  CEFAZOLIN: SIGNIFICANT CHANGE UP
-  CEFEPIME: SIGNIFICANT CHANGE UP
-  CEFOXITIN: SIGNIFICANT CHANGE UP
-  CEFTRIAXONE: SIGNIFICANT CHANGE UP
-  CIPROFLOXACIN: SIGNIFICANT CHANGE UP
-  GENTAMICIN: SIGNIFICANT CHANGE UP
-  LEVOFLOXACIN: SIGNIFICANT CHANGE UP
-  PIPERACILLIN/TAZOBACTAM: SIGNIFICANT CHANGE UP
-  TOBRAMYCIN: SIGNIFICANT CHANGE UP
-  TRIMETHOPRIM/SULFAMETHOXAZOLE: SIGNIFICANT CHANGE UP
ALBUMIN SERPL ELPH-MCNC: 3 G/DL — LOW (ref 3.3–5)
ALP SERPL-CCNC: 88 U/L — SIGNIFICANT CHANGE UP (ref 40–120)
ALT FLD-CCNC: 7 U/L — LOW (ref 10–45)
ANION GAP SERPL CALC-SCNC: 10 MMOL/L — SIGNIFICANT CHANGE UP (ref 5–17)
AST SERPL-CCNC: 16 U/L — SIGNIFICANT CHANGE UP (ref 10–40)
BASOPHILS # BLD AUTO: 0.02 K/UL — SIGNIFICANT CHANGE UP (ref 0–0.2)
BASOPHILS NFR BLD AUTO: 0.2 % — SIGNIFICANT CHANGE UP (ref 0–2)
BILIRUB SERPL-MCNC: 0.3 MG/DL — SIGNIFICANT CHANGE UP (ref 0.2–1.2)
BUN SERPL-MCNC: 12 MG/DL — SIGNIFICANT CHANGE UP (ref 7–23)
CALCIUM SERPL-MCNC: 8.2 MG/DL — LOW (ref 8.4–10.5)
CHLORIDE SERPL-SCNC: 101 MMOL/L — SIGNIFICANT CHANGE UP (ref 96–108)
CO2 SERPL-SCNC: 19 MMOL/L — LOW (ref 22–31)
CREAT SERPL-MCNC: 1.09 MG/DL — SIGNIFICANT CHANGE UP (ref 0.5–1.3)
CULTURE RESULTS: ABNORMAL
CULTURE RESULTS: ABNORMAL
EGFR: 51 ML/MIN/1.73M2 — LOW
EOSINOPHIL # BLD AUTO: 0.08 K/UL — SIGNIFICANT CHANGE UP (ref 0–0.5)
EOSINOPHIL NFR BLD AUTO: 0.8 % — SIGNIFICANT CHANGE UP (ref 0–6)
FOLATE SERPL-MCNC: 3.8 NG/ML — LOW
GLUCOSE SERPL-MCNC: 117 MG/DL — HIGH (ref 70–99)
HCT VFR BLD CALC: 36.3 % — SIGNIFICANT CHANGE UP (ref 34.5–45)
HGB BLD-MCNC: 12.3 G/DL — SIGNIFICANT CHANGE UP (ref 11.5–15.5)
IMM GRANULOCYTES NFR BLD AUTO: 1.5 % — HIGH (ref 0–0.9)
LYMPHOCYTES # BLD AUTO: 0.62 K/UL — LOW (ref 1–3.3)
LYMPHOCYTES # BLD AUTO: 6.4 % — LOW (ref 13–44)
MAGNESIUM SERPL-MCNC: 1.9 MG/DL — SIGNIFICANT CHANGE UP (ref 1.6–2.6)
MCHC RBC-ENTMCNC: 33.9 GM/DL — SIGNIFICANT CHANGE UP (ref 32–36)
MCHC RBC-ENTMCNC: 37.2 PG — HIGH (ref 27–34)
MCV RBC AUTO: 109.7 FL — HIGH (ref 80–100)
METHOD TYPE: SIGNIFICANT CHANGE UP
MONOCYTES # BLD AUTO: 1 K/UL — HIGH (ref 0–0.9)
MONOCYTES NFR BLD AUTO: 10.3 % — SIGNIFICANT CHANGE UP (ref 2–14)
NEUTROPHILS # BLD AUTO: 7.88 K/UL — HIGH (ref 1.8–7.4)
NEUTROPHILS NFR BLD AUTO: 80.8 % — HIGH (ref 43–77)
NRBC # BLD: 0 /100 WBCS — SIGNIFICANT CHANGE UP (ref 0–0)
ORGANISM # SPEC MICROSCOPIC CNT: ABNORMAL
ORGANISM # SPEC MICROSCOPIC CNT: ABNORMAL
ORGANISM # SPEC MICROSCOPIC CNT: SIGNIFICANT CHANGE UP
PHOSPHATE SERPL-MCNC: 2.3 MG/DL — LOW (ref 2.5–4.5)
PLATELET # BLD AUTO: 213 K/UL — SIGNIFICANT CHANGE UP (ref 150–400)
POTASSIUM SERPL-MCNC: 3.9 MMOL/L — SIGNIFICANT CHANGE UP (ref 3.5–5.3)
POTASSIUM SERPL-SCNC: 3.9 MMOL/L — SIGNIFICANT CHANGE UP (ref 3.5–5.3)
PROT SERPL-MCNC: 5.9 G/DL — LOW (ref 6–8.3)
RBC # BLD: 3.31 M/UL — LOW (ref 3.8–5.2)
RBC # FLD: 18 % — HIGH (ref 10.3–14.5)
SODIUM SERPL-SCNC: 130 MMOL/L — LOW (ref 135–145)
SPECIMEN SOURCE: SIGNIFICANT CHANGE UP
SPECIMEN SOURCE: SIGNIFICANT CHANGE UP
VIT B12 SERPL-MCNC: 355 PG/ML — SIGNIFICANT CHANGE UP (ref 232–1245)
WBC # BLD: 9.75 K/UL — SIGNIFICANT CHANGE UP (ref 3.8–10.5)
WBC # FLD AUTO: 9.75 K/UL — SIGNIFICANT CHANGE UP (ref 3.8–10.5)

## 2024-10-27 PROCEDURE — 99233 SBSQ HOSP IP/OBS HIGH 50: CPT | Mod: GC

## 2024-10-27 RX ORDER — DIBASIC SODIUM PHOSPHATE, MONOBASIC POTASSIUM PHOSPHATE AND MONOBASIC SODIUM PHOSPHATE 852; 155; 130 MG/1; MG/1; MG/1
1 TABLET ORAL ONCE
Refills: 0 | Status: DISCONTINUED | OUTPATIENT
Start: 2024-10-27 | End: 2024-10-29

## 2024-10-27 RX ORDER — SENNA 187 MG
1 TABLET ORAL
Refills: 0 | Status: DISCONTINUED | OUTPATIENT
Start: 2024-10-27 | End: 2024-10-29

## 2024-10-27 RX ORDER — POLYETHYLENE GLYCOL 3350 17 G/17G
17 POWDER, FOR SOLUTION ORAL
Refills: 0 | Status: DISCONTINUED | OUTPATIENT
Start: 2024-10-27 | End: 2024-10-29

## 2024-10-27 RX ORDER — CYANOCOBALAMIN (VITAMIN B-12) 1000MCG/15
1000 LIQUID (ML) ORAL EVERY 24 HOURS
Refills: 0 | Status: DISCONTINUED | OUTPATIENT
Start: 2024-10-27 | End: 2024-10-29

## 2024-10-27 RX ORDER — FOLIC ACID 1 MG/1
1 TABLET ORAL EVERY 24 HOURS
Refills: 0 | Status: DISCONTINUED | OUTPATIENT
Start: 2024-10-27 | End: 2024-10-29

## 2024-10-27 RX ADMIN — Medication 1 TABLET(S): at 14:44

## 2024-10-27 RX ADMIN — RIVAROXABAN 15 MILLIGRAM(S): 20 TABLET, FILM COATED ORAL at 17:42

## 2024-10-27 RX ADMIN — Medication 3 MILLIGRAM(S): at 21:41

## 2024-10-27 RX ADMIN — Medication 500 MILLIGRAM(S): at 06:05

## 2024-10-27 RX ADMIN — QUETIAPINE FUMARATE 25 MILLIGRAM(S): 200 TABLET ORAL at 00:12

## 2024-10-27 RX ADMIN — Medication 100 MILLIGRAM(S): at 06:05

## 2024-10-27 RX ADMIN — QUETIAPINE FUMARATE 25 MILLIGRAM(S): 200 TABLET ORAL at 21:41

## 2024-10-27 RX ADMIN — Medication 3 MILLIGRAM(S): at 00:12

## 2024-10-27 RX ADMIN — POLYETHYLENE GLYCOL 3350 17 GRAM(S): 17 POWDER, FOR SOLUTION ORAL at 14:43

## 2024-10-27 RX ADMIN — Medication 10 MILLIGRAM(S): at 14:44

## 2024-10-27 RX ADMIN — FOLIC ACID 1 MILLIGRAM(S): 1 TABLET ORAL at 07:22

## 2024-10-27 RX ADMIN — Medication 1000 MICROGRAM(S): at 07:22

## 2024-10-27 RX ADMIN — Medication 500 MILLIGRAM(S): at 17:42

## 2024-10-27 RX ADMIN — Medication 88 MICROGRAM(S): at 06:05

## 2024-10-27 NOTE — PROGRESS NOTE ADULT - SUBJECTIVE AND OBJECTIVE BOX
SUBJECTIVE:  KHURRAM SHIELDS is doing well this morning. Today is hospital day 2d.     24 Hour Events:   - No acute overnight events.    ALLERGIES:  sulfa drugs (Hives)    MEDICATIONS:  STANDING MEDICATIONS  cefTRIAXone   IVPB 2000 milliGRAM(s) IV Intermittent every 24 hours  cyanocobalamin 1000 MICROGram(s) Oral every 24 hours  folic acid 1 milliGRAM(s) Oral every 24 hours  hydroxyurea 500 milliGRAM(s) Oral two times a day  levothyroxine 88 MICROGram(s) Oral daily  melatonin 3 milliGRAM(s) Oral at bedtime  metoprolol tartrate 100 milliGRAM(s) Oral daily  QUEtiapine 25 milliGRAM(s) Oral at bedtime  rivaroxaban 15 milliGRAM(s) Oral with dinner    PRN MEDICATIONS  acetaminophen     Tablet .. 1000 milliGRAM(s) Oral every 8 hours PRN  aluminum hydroxide/magnesium hydroxide/simethicone Suspension 30 milliLiter(s) Oral every 4 hours PRN  ondansetron Injectable 4 milliGRAM(s) IV Push every 8 hours PRN    VITALS:   ICU Vital Signs Last 24 Hrs  T(C): 36.4 (27 Oct 2024 05:48), Max: 37.3 (26 Oct 2024 12:07)  T(F): 97.6 (27 Oct 2024 05:48), Max: 99.1 (26 Oct 2024 12:07)  HR: 94 (27 Oct 2024 05:48) (83 - 106)  BP: 143/84 (27 Oct 2024 05:48) (125/78 - 154/81)  BP(mean): --  ABP: --  ABP(mean): --  RR: 18 (27 Oct 2024 05:48) (17 - 18)  SpO2: 96% (27 Oct 2024 05:48) (94% - 96%)    O2 Parameters below as of 27 Oct 2024 00:07  Patient On (Oxygen Delivery Method): room air      PHYSICAL EXAM  GENERAL: NAD  HEAD:  Atraumatic, Normocephalic  EYES: EOMI, PERRLA, conjunctiva and sclera clear  NECK: Supple, No JVD.  NERVOUS SYSTEM:  Alert & Awake. Oriented to person, somewhat oriented to place (says she's in hospital, but not which), not oriented to time.   CHEST/LUNG: B/L good air entry; No rales, rhonchi, or wheezing  HEART: S1S2 normal. Regular rate and rhythm.  ABDOMEN: Soft, Nondistended; mildly tender in suprapubic region. Bowel sounds present  EXTREMITIES:  2+ Peripheral Pulses, trace LE edema  LYMPH: No lymphadenopathy noted  SKIN: No grossly apparent rashes or lesions        LABS:                        12.3   14.96 )-----------( 223      ( 26 Oct 2024 07:06 )             36.9     10    135  |  104  |  20  ----------------------------<  103[H]  4.0   |  19[L]  |  1.35[H]    Ca    8.4      26 Oct 2024 07:06  Phos  2.6     10-26  Mg     2.1     10-26    TPro  7.1  /  Alb  3.6  /  TBili  1.3[H]  /  DBili  x   /  AST  13[L]  /  ALT  13  /  AlkPhos  95  10      Urinalysis Basic - ( 26 Oct 2024 11:03 )    Color: Yellow / Appearance: Clear / S.017 / pH: x  Gluc: x / Ketone: 15 mg/dL  / Bili: Negative / Urobili: 1.0 mg/dL   Blood: x / Protein: 100 mg/dL / Nitrite: Negative   Leuk Esterase: Moderate / RBC: 1 /HPF / WBC 44 /HPF   Sq Epi: x / Non Sq Epi: 2 /HPF / Bacteria: Negative /HPF          MICRO:    Culture - Urine (collected 25 Oct 2024 10:39)  Source: Clean Catch None  Final Report (26 Oct 2024 21:51):    <10,000 CFU/mL Normal Urogenital Janet    Culture - Blood (collected 25 Oct 2024 10:39)  Source: .Blood BLOOD  Gram Stain (26 Oct 2024 04:10):    Growth in aerobic bottle: Gram Negative Rods    Growth in anaerobic bottle: Gram Negative Rods  Preliminary Report (26 Oct 2024 18:13):    Growth in aerobic and anaerobic bottles: Escherichia coli    Direct identification is available within approximately 3-5    hours either by Blood Panel Multiplexed PCR or Direct    MALDI-TOF. Details: [email or web address]  Organism: Blood Culture PCR (26 Oct 2024 02:45)  Organism: Blood Culture PCR (26 Oct 2024 02:45)    Culture - Blood (collected 25 Oct 2024 10:39)  Source: .Blood BLOOD  Gram Stain (26 Oct 2024 08:16):    Growth in aerobic bottle: Gram Negative Rods    Growth in anaerobic bottle: Gram Negative Rods  Preliminary Report (26 Oct 2024 21:48):    Growth in aerobic and anaerobic bottles: Escherichia coli    See previous culture [id number]    Urinalysis with Rflx Culture (collected 25 Oct 2024 10:39)      CARDS:  no new      RADIOLOGY:  < from: CT Abdomen and Pelvis w/ IV Cont (10.26.24 @ 17:12) >  IMPRESSION:  1.  Wedgelike regions of diminished RIGHT renal enhancement (5/46, 59)   most compatible with pyelonephritis. Mild concentric urinary bladder   mural containing and trace mucosal hyperemia most c/w  cystitis.  2.  Segmental pancreatic tail atrophy (5/46) without upstream pancreatic   abnormality. Contrast MRI recommended to exclude occult pancreatic   malignancy  3.  Multiple splenic contour and adjacent linear defects w/ trace   subcapsular fluid, developed from 9/10/2015 most compatible with sequelae   of prior trauma and/or splenic infarcts.    < end of copied text >       SUBJECTIVE:  KHURRAM SHIELDS is doing well this morning. Today is hospital day 2d. She has no acute complaints this morning. Denies fever, chills, SOB, abdominal pain, dysuria.   Likes to travel when outside of the hospital - favorite place she has visited is [place].    24 Hour Events:   - No acute overnight events.    ALLERGIES:  sulfa drugs (Hives)    MEDICATIONS:  STANDING MEDICATIONS  cefTRIAXone   IVPB 2000 milliGRAM(s) IV Intermittent every 24 hours  cyanocobalamin 1000 MICROGram(s) Oral every 24 hours  folic acid 1 milliGRAM(s) Oral every 24 hours  hydroxyurea 500 milliGRAM(s) Oral two times a day  levothyroxine 88 MICROGram(s) Oral daily  melatonin 3 milliGRAM(s) Oral at bedtime  metoprolol tartrate 100 milliGRAM(s) Oral daily  QUEtiapine 25 milliGRAM(s) Oral at bedtime  rivaroxaban 15 milliGRAM(s) Oral with dinner    PRN MEDICATIONS  acetaminophen     Tablet .. 1000 milliGRAM(s) Oral every 8 hours PRN  aluminum hydroxide/magnesium hydroxide/simethicone Suspension 30 milliLiter(s) Oral every 4 hours PRN  ondansetron Injectable 4 milliGRAM(s) IV Push every 8 hours PRN    VITALS:   ICU Vital Signs Last 24 Hrs  T(C): 36.4 (27 Oct 2024 05:48), Max: 37.3 (26 Oct 2024 12:07)  T(F): 97.6 (27 Oct 2024 05:48), Max: 99.1 (26 Oct 2024 12:07)  HR: 94 (27 Oct 2024 05:48) (83 - 106)  BP: 143/84 (27 Oct 2024 05:48) (125/78 - 154/81)  BP(mean): --  ABP: --  ABP(mean): --  RR: 18 (27 Oct 2024 05:48) (17 - 18)  SpO2: 96% (27 Oct 2024 05:48) (94% - 96%)    O2 Parameters below as of 27 Oct 2024 00:07  Patient On (Oxygen Delivery Method): room air      PHYSICAL EXAM  GENERAL: NAD, pleasant  HEAD:  Atraumatic, Normocephalic  EYES: EOMI, PERRLA, conjunctiva and sclera clear  NECK: Supple, No JVD.  NERVOUS SYSTEM:  Alert & Awake. Oriented to person, & time, but not place.   CHEST/LUNG: B/L good air entry; No rales, rhonchi, or wheezing  HEART: S1S2 normal. Regular rate and rhythm.  ABDOMEN: Soft, Nondistended; non-tender  EXTREMITIES:  2+ Peripheral Pulses, trace LE edema  LYMPH: No lymphadenopathy noted  SKIN: No grossly apparent rashes or lesions        LABS:                        12.3   14.96 )-----------( 223      ( 26 Oct 2024 07:06 )             36.9     10-26    135  |  104  |  20  ----------------------------<  103[H]  4.0   |  19[L]  |  1.35[H]    Ca    8.4      26 Oct 2024 07:06  Phos  2.6     10-26  Mg     2.1     10-26    TPro  7.1  /  Alb  3.6  /  TBili  1.3[H]  /  DBili  x   /  AST  13[L]  /  ALT  13  /  AlkPhos  95  10-25      Urinalysis Basic - ( 26 Oct 2024 11:03 )    Color: Yellow / Appearance: Clear / S.017 / pH: x  Gluc: x / Ketone: 15 mg/dL  / Bili: Negative / Urobili: 1.0 mg/dL   Blood: x / Protein: 100 mg/dL / Nitrite: Negative   Leuk Esterase: Moderate / RBC: 1 /HPF / WBC 44 /HPF   Sq Epi: x / Non Sq Epi: 2 /HPF / Bacteria: Negative /HPF          MICRO:    Culture - Urine (collected 25 Oct 2024 10:39)  Source: Clean Catch None  Final Report (26 Oct 2024 21:51):    <10,000 CFU/mL Normal Urogenital Janet    Culture - Blood (collected 25 Oct 2024 10:39)  Source: .Blood BLOOD  Gram Stain (26 Oct 2024 04:10):    Growth in aerobic bottle: Gram Negative Rods    Growth in anaerobic bottle: Gram Negative Rods  Preliminary Report (26 Oct 2024 18:13):    Growth in aerobic and anaerobic bottles: Escherichia coli    Direct identification is available within approximately 3-5    hours either by Blood Panel Multiplexed PCR or Direct    MALDI-TOF. Details: [email or web address]  Organism: Blood Culture PCR (26 Oct 2024 02:45)  Organism: Blood Culture PCR (26 Oct 2024 02:45)    Culture - Blood (collected 25 Oct 2024 10:39)  Source: .Blood BLOOD  Gram Stain (26 Oct 2024 08:16):    Growth in aerobic bottle: Gram Negative Rods    Growth in anaerobic bottle: Gram Negative Rods  Preliminary Report (26 Oct 2024 21:48):    Growth in aerobic and anaerobic bottles: Escherichia coli    See previous culture [id number]    Urinalysis with Rflx Culture (collected 25 Oct 2024 10:39)      CARDS:  no new      RADIOLOGY:  < from: CT Abdomen and Pelvis w/ IV Cont (10.26.24 @ 17:12) >  IMPRESSION:  1.  Wedgelike regions of diminished RIGHT renal enhancement (5/46, 59)   most compatible with pyelonephritis. Mild concentric urinary bladder   mural containing and trace mucosal hyperemia most c/w  cystitis.  2.  Segmental pancreatic tail atrophy (5/46) without upstream pancreatic   abnormality. Contrast MRI recommended to exclude occult pancreatic   malignancy  3.  Multiple splenic contour and adjacent linear defects w/ trace   subcapsular fluid, developed from 9/10/2015 most compatible with sequelae   of prior trauma and/or splenic infarcts.    < end of copied text >

## 2024-10-27 NOTE — PROGRESS NOTE ADULT - PROBLEM SELECTOR PLAN 3
plan as above plan as above    #Constipation  Patient with significant stool burden on CT. Could have obstruction given large stool burden which could have contributed to her UTI.   - Bowel regimen: senna BID, miralax BID, dulcolax suppository

## 2024-10-27 NOTE — OCCUPATIONAL THERAPY INITIAL EVALUATION ADULT - DIAGNOSIS, OT EVAL
Patient presents with cognitive impairements (baseline dementia) demonstrating decrease balance with unsteady gait affecting ADLs and functional mobility.

## 2024-10-27 NOTE — PROGRESS NOTE ADULT - PROBLEM SELECTOR PLAN 1
2/4 sirs criteria (fever, leukocytosis - though, of note, wbc close to baseline), likely source uti  sp ctx 1g in ed, 2l ivf, tylenol  uti sx x2+ weeks, recently completed treatment w macrobid outpatient  bcx collected in ed +gram negative rods, E.coli - likely source genitourinary tract  CTAP 10/26 showing pyelonephritis & cystitis    - continue ctx 2g qd for now (ordered for 8d course), narrow as appropriate  - follow up ucx, bcx sensitivities  - hold home nifedipine for now 2/4 sirs criteria (fever, leukocytosis - though, of note, wbc close to baseline), likely source uti  sp ctx 1g in ed, 2l ivf, tylenol  uti sx x2+ weeks, recently completed treatment w macrobid outpatient  bcx collected in ed +gram negative rods, E.coli - likely source genitourinary tract  CTAP 10/26 showing pyelonephritis & cystitis    - continue ctx 2g qd for now (ordered for 8d course), narrow as appropriate  - follow up ucx, bcx sensitivities  - hold home nifedipine for now  - f/u repeat surveillance cultures 10/27

## 2024-10-27 NOTE — PATIENT PROFILE ADULT - FALL HARM RISK - HARM RISK INTERVENTIONS

## 2024-10-27 NOTE — PROGRESS NOTE ADULT - PROBLEM SELECTOR PLAN 5
home medication(s): xarelto 15mg qd, metoprolol tartrate 100qd    - continue home xarelto  - resuming home metoprolol tartrate 100 qd with hold parameters home medication(s): Xarelto 15mg qd, metoprolol tartrate 100qd    - continue home Xarelto  - c/w home metoprolol tartrate 100 qd with hold parameters

## 2024-10-27 NOTE — OCCUPATIONAL THERAPY INITIAL EVALUATION ADULT - LIVES WITH, PROFILE
Pt is poor historian. Obtain pt;s PLOF from chart., pt has HHA unknown number of hours for HHA, level of assist and DME.

## 2024-10-27 NOTE — PHYSICAL THERAPY INITIAL EVALUATION ADULT - GENERAL OBSERVATIONS, REHAB EVAL
PT IE Completed. Pt received semi-supine in bed, NAD, +hep-lock, +CB, +alarm. Cleared for PT by KEDAR Johnson.

## 2024-10-27 NOTE — PROGRESS NOTE ADULT - PROBLEM SELECTOR PLAN 6
home medication(s): metoprolol tartrate 100mg qd, nifedipine 30mg qd    - hold home nifedipine for now given acute sepsis, resume as appropriate  - restarted home metoprolol given afib w/ intermittent rvr

## 2024-10-27 NOTE — PATIENT PROFILE ADULT - FUNCTIONAL ASSESSMENT - BASIC MOBILITY 6.
97.8 2-calculated by average/Not able to assess (calculate score using [facility] averaging method)

## 2024-10-27 NOTE — PROGRESS NOTE ADULT - PROBLEM SELECTOR PLAN 4
cr at presentation 1.69, baseline around 1, likely prerenal vs less-likely intrinsic due to pyelonephritis  no cva tenderness on exam    - maintenance fluids at 100cc/h x10 hours (to complete 10/26 am) --> switched to LR   - continue to trend creatinine, avoid nephrotoxins, renally dose medications cr at presentation 1.69, baseline around 1, likely prerenal vs less-likely intrinsic due to pyelonephritis  no cva tenderness on exam    - s/p maintenance fluids at 100cc/h x10 hours, 1L LR 10/26  - continue to trend creatinine, avoid nephrotoxins, renally dose medications    #Hyponatremia  Patient with hyponatremia s/p fluids. 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 Could be SIADH in the setting of infection. Could also be enhanced by fluids.   - f/u BMP  - repeat urine studies if no improvement 10/28

## 2024-10-27 NOTE — OCCUPATIONAL THERAPY INITIAL EVALUATION ADULT - PERTINENT HX OF CURRENT PROBLEM, REHAB EVAL
Continue chemotherapy. Scheduled/Gianna  Follow up with JESSICA Chin in 2 weeks for next treatment.  Scheduled/Gianna  CT scan in first week of December.  Scheduled/gianna  See me in 4 weeks.  Scheduled/gianna  Take imodium as needed.      AVS printed & given to patient with oral contrast/Gianna   79F PMHx dementia, afib, htn, hypothyroidism, jak2 mds, who presented to ed w hha after pt was noted to be febrile at home (not measured). per hha & hcp discussions w ed provider, pt was diagnosed w uti few weeks ago, sp tx w po macrobid. pt herself is unable to communicate sx due to baseline mental status iso dementia. hha reports noting increased fatigue & urinary frequency in the day preceding admission, sx similar to prior instance of uti, no obvious complaints of discomfort.

## 2024-10-27 NOTE — PHYSICAL THERAPY INITIAL EVALUATION ADULT - PERTINENT HX OF CURRENT PROBLEM, REHAB EVAL
79F PMHx dementia, afib, htn, hypothyroidism, jak2 mds, who presented to ed w hha after pt was noted to be febrile at home (not measured). per hha & hcp discussions w ed provider, pt was diagnosed w uti few weeks ago, sp tx w po macrobid. pt herself is unable to communicate sx due to baseline mental status iso dementia. hha reports noting increased fatigue & urinary frequency in the day preceding admission, sx similar to prior instance of uti, no obvious complaints of discomfort.

## 2024-10-28 ENCOUNTER — TRANSCRIPTION ENCOUNTER (OUTPATIENT)
Age: 79
End: 2024-10-28

## 2024-10-28 LAB
ANION GAP SERPL CALC-SCNC: 11 MMOL/L — SIGNIFICANT CHANGE UP (ref 5–17)
BASOPHILS # BLD AUTO: 0.02 K/UL — SIGNIFICANT CHANGE UP (ref 0–0.2)
BASOPHILS NFR BLD AUTO: 0.3 % — SIGNIFICANT CHANGE UP (ref 0–2)
BUN SERPL-MCNC: 9 MG/DL — SIGNIFICANT CHANGE UP (ref 7–23)
CALCIUM SERPL-MCNC: 8.1 MG/DL — LOW (ref 8.4–10.5)
CHLORIDE SERPL-SCNC: 100 MMOL/L — SIGNIFICANT CHANGE UP (ref 96–108)
CO2 SERPL-SCNC: 21 MMOL/L — LOW (ref 22–31)
CREAT SERPL-MCNC: 1.03 MG/DL — SIGNIFICANT CHANGE UP (ref 0.5–1.3)
EGFR: 55 ML/MIN/1.73M2 — LOW
EOSINOPHIL # BLD AUTO: 0.11 K/UL — SIGNIFICANT CHANGE UP (ref 0–0.5)
EOSINOPHIL NFR BLD AUTO: 1.5 % — SIGNIFICANT CHANGE UP (ref 0–6)
GLUCOSE SERPL-MCNC: 169 MG/DL — HIGH (ref 70–99)
HCT VFR BLD CALC: 36.3 % — SIGNIFICANT CHANGE UP (ref 34.5–45)
HGB BLD-MCNC: 12.4 G/DL — SIGNIFICANT CHANGE UP (ref 11.5–15.5)
IMM GRANULOCYTES NFR BLD AUTO: 0.7 % — SIGNIFICANT CHANGE UP (ref 0–0.9)
LYMPHOCYTES # BLD AUTO: 0.66 K/UL — LOW (ref 1–3.3)
LYMPHOCYTES # BLD AUTO: 8.8 % — LOW (ref 13–44)
MAGNESIUM SERPL-MCNC: 1.8 MG/DL — SIGNIFICANT CHANGE UP (ref 1.6–2.6)
MCHC RBC-ENTMCNC: 34.2 GM/DL — SIGNIFICANT CHANGE UP (ref 32–36)
MCHC RBC-ENTMCNC: 37 PG — HIGH (ref 27–34)
MCV RBC AUTO: 108.4 FL — HIGH (ref 80–100)
MONOCYTES # BLD AUTO: 1.22 K/UL — HIGH (ref 0–0.9)
MONOCYTES NFR BLD AUTO: 16.4 % — HIGH (ref 2–14)
NEUTROPHILS # BLD AUTO: 5.4 K/UL — SIGNIFICANT CHANGE UP (ref 1.8–7.4)
NEUTROPHILS NFR BLD AUTO: 72.3 % — SIGNIFICANT CHANGE UP (ref 43–77)
NRBC # BLD: 0 /100 WBCS — SIGNIFICANT CHANGE UP (ref 0–0)
PHOSPHATE SERPL-MCNC: 2.2 MG/DL — LOW (ref 2.5–4.5)
PLATELET # BLD AUTO: 241 K/UL — SIGNIFICANT CHANGE UP (ref 150–400)
POTASSIUM SERPL-MCNC: 3.7 MMOL/L — SIGNIFICANT CHANGE UP (ref 3.5–5.3)
POTASSIUM SERPL-SCNC: 3.7 MMOL/L — SIGNIFICANT CHANGE UP (ref 3.5–5.3)
RBC # BLD: 3.35 M/UL — LOW (ref 3.8–5.2)
RBC # FLD: 17.6 % — HIGH (ref 10.3–14.5)
SODIUM SERPL-SCNC: 132 MMOL/L — LOW (ref 135–145)
WBC # BLD: 7.46 K/UL — SIGNIFICANT CHANGE UP (ref 3.8–10.5)
WBC # FLD AUTO: 7.46 K/UL — SIGNIFICANT CHANGE UP (ref 3.8–10.5)

## 2024-10-28 PROCEDURE — 99232 SBSQ HOSP IP/OBS MODERATE 35: CPT

## 2024-10-28 RX ORDER — SENNA 187 MG
2 TABLET ORAL
Qty: 60 | Refills: 0
Start: 2024-10-28 | End: 2024-11-26

## 2024-10-28 RX ORDER — AMOXICILLIN AND CLAVULANATE POTASSIUM 600; 42.9 MG/5ML; MG/5ML
875 POWDER, FOR SUSPENSION ORAL
Qty: 16 | Refills: 0
Start: 2024-10-28 | End: 2024-11-04

## 2024-10-28 RX ORDER — DIBASIC SODIUM PHOSPHATE, MONOBASIC POTASSIUM PHOSPHATE AND MONOBASIC SODIUM PHOSPHATE 852; 155; 130 MG/1; MG/1; MG/1
1 TABLET ORAL ONCE
Refills: 0 | Status: COMPLETED | OUTPATIENT
Start: 2024-10-28 | End: 2024-10-28

## 2024-10-28 RX ORDER — FOLIC ACID 1 MG/1
1 TABLET ORAL
Qty: 30 | Refills: 0
Start: 2024-10-28 | End: 2024-11-26

## 2024-10-28 RX ORDER — NIFEDIPINE 90 MG
30 TABLET, EXTENDED RELEASE 24 HR ORAL EVERY 24 HOURS
Refills: 0 | Status: DISCONTINUED | OUTPATIENT
Start: 2024-10-28 | End: 2024-10-29

## 2024-10-28 RX ORDER — POLYETHYLENE GLYCOL 3350 17 G/17G
17 POWDER, FOR SOLUTION ORAL
Qty: 1 | Refills: 0
Start: 2024-10-28 | End: 2024-11-26

## 2024-10-28 RX ADMIN — Medication 1000 MILLIGRAM(S): at 23:08

## 2024-10-28 RX ADMIN — Medication 500 MILLIGRAM(S): at 19:41

## 2024-10-28 RX ADMIN — Medication 100 MILLIGRAM(S): at 06:11

## 2024-10-28 RX ADMIN — Medication 1000 MICROGRAM(S): at 08:16

## 2024-10-28 RX ADMIN — Medication 3 MILLIGRAM(S): at 22:41

## 2024-10-28 RX ADMIN — QUETIAPINE FUMARATE 25 MILLIGRAM(S): 200 TABLET ORAL at 22:42

## 2024-10-28 RX ADMIN — Medication 88 MICROGRAM(S): at 05:25

## 2024-10-28 RX ADMIN — FOLIC ACID 1 MILLIGRAM(S): 1 TABLET ORAL at 08:16

## 2024-10-28 RX ADMIN — POLYETHYLENE GLYCOL 3350 17 GRAM(S): 17 POWDER, FOR SOLUTION ORAL at 05:25

## 2024-10-28 RX ADMIN — Medication 30 MILLIGRAM(S): at 22:30

## 2024-10-28 RX ADMIN — Medication 100 MILLIGRAM(S): at 05:22

## 2024-10-28 RX ADMIN — DIBASIC SODIUM PHOSPHATE, MONOBASIC POTASSIUM PHOSPHATE AND MONOBASIC SODIUM PHOSPHATE 1 PACKET(S): 852; 155; 130 TABLET ORAL at 18:06

## 2024-10-28 RX ADMIN — Medication 1 TABLET(S): at 05:25

## 2024-10-28 RX ADMIN — Medication 500 MILLIGRAM(S): at 05:26

## 2024-10-28 RX ADMIN — RIVAROXABAN 15 MILLIGRAM(S): 20 TABLET, FILM COATED ORAL at 19:42

## 2024-10-28 NOTE — PROGRESS NOTE ADULT - PROBLEM SELECTOR PLAN 9
mcv 110.6 at presentation, baseline 90. TSH normal. Folate low. VitB12 low normal.   hx of mds, hypothyroidism, elderly w dementia & possible nutrient deficiency    - folic acid 1mg QD  - cyanocobalamin 1000mg QD
mcv 110.6 at presentation, baseline 90. TSH normal. Folate low. VitB12 low normal.   hx of mds, hypothyroidism, elderly w dementia & possible nutrient deficiency    - folic acid 1mg QD  - cyanocobalamin 1000mg QD
mcv 110.6 at presentation, baseline 90  hx of mds, hypothyroidism, elderly w dementia & possible nutrient deficiency    - follow up am tsh, b12, folate

## 2024-10-28 NOTE — DIETITIAN INITIAL EVALUATION ADULT - PERTINENT MEDS FT
MEDICATIONS  (STANDING):  cefTRIAXone   IVPB 2000 milliGRAM(s) IV Intermittent every 24 hours  cyanocobalamin 1000 MICROGram(s) Oral every 24 hours  folic acid 1 milliGRAM(s) Oral every 24 hours  hydroxyurea 500 milliGRAM(s) Oral two times a day  levothyroxine 88 MICROGram(s) Oral daily  melatonin 3 milliGRAM(s) Oral at bedtime  metoprolol tartrate 100 milliGRAM(s) Oral daily  polyethylene glycol 3350 17 Gram(s) Oral two times a day  potassium phosphate / sodium phosphate Powder (PHOS-NaK) 1 Packet(s) Oral once  QUEtiapine 25 milliGRAM(s) Oral at bedtime  rivaroxaban 15 milliGRAM(s) Oral with dinner  senna 1 Tablet(s) Oral two times a day    MEDICATIONS  (PRN):  acetaminophen     Tablet .. 1000 milliGRAM(s) Oral every 8 hours PRN Temp greater or equal to 38C (100.4F), Mild Pain (1 - 3)  aluminum hydroxide/magnesium hydroxide/simethicone Suspension 30 milliLiter(s) Oral every 4 hours PRN Dyspepsia  ondansetron Injectable 4 milliGRAM(s) IV Push every 8 hours PRN Nausea and/or Vomiting

## 2024-10-28 NOTE — DIETITIAN INITIAL EVALUATION ADULT - PROBLEM SELECTOR PLAN 1
2/4 sirs criteria (fever, leukocytosis - though, of note, wbc close to baseline), likely source uti  sp ctx 1g in ed, 2l ivf, tylenol  uti sx x2+ weeks, recently completed treatment w macrobid outpatient  bcx collected in ed +gram negative rods - likely source genitourinary tract    - continue ctx 2g qd for now (ordered for 8d course), narrow as appropriate  - follow up ctap  - follow up ucx, sensitivities  - daily surveillance bcx  - hold home metoprolol, nifedipine for now

## 2024-10-28 NOTE — DIETITIAN INITIAL EVALUATION ADULT - ORAL INTAKE PTA/DIET HISTORY
No known food allergies nor food intolerances reported.

## 2024-10-28 NOTE — PROGRESS NOTE ADULT - PROBLEM SELECTOR PLAN 8
Jak2+ myleoproliferative syndrome  follows w dr. kerr every 6mo, recently resumed hydroxyurea  chronic leukocytosis, thrombocytosis (plts wnl at presentation)  home medication(s): hydroxyurea 500mg bid    - continue home medication(s)

## 2024-10-28 NOTE — DIETITIAN INITIAL EVALUATION ADULT - PROBLEM SELECTOR PLAN 6
home medication(s): metoprolol 100mg qd, nifedipine 30mg qd    - hold home medications for now given acute sepsis, resume as appropriate

## 2024-10-28 NOTE — DIETITIAN INITIAL EVALUATION ADULT - PROBLEM SELECTOR PLAN 7
home medication(s): levothyroxine 88mcg - 1 tab tuesday - friday, sunday; 2 tabs monday & saturday    - continue home medication(s)

## 2024-10-28 NOTE — PROGRESS NOTE ADULT - TIME BILLING
Bedside exam and interview   Reviewed vitals, labs   Discussed patient's plan of care with housestaff   Documentation of encounter  Excludes teaching time and/or separately reported services

## 2024-10-28 NOTE — PROGRESS NOTE ADULT - PROBLEM SELECTOR PLAN 3
plan as above    #Constipation  Patient with significant stool burden on CT. Could have obstruction given large stool burden which could have contributed to her UTI.   - Bowel regimen: senna BID, miralax BID, dulcolax suppository

## 2024-10-28 NOTE — PROGRESS NOTE ADULT - PROBLEM SELECTOR PLAN 5
home medication(s): Xarelto 15mg qd, metoprolol tartrate 100qd    - continue home Xarelto  - c/w home metoprolol tartrate 100 qd with hold parameters

## 2024-10-28 NOTE — DIETITIAN INITIAL EVALUATION ADULT - PROBLEM SELECTOR PLAN 5
home medication(s): xarelto 15mg qd, metoprolol 100qd    - continue home xarelto  - hold metoprolol for now, resume as appropriate

## 2024-10-28 NOTE — PROGRESS NOTE ADULT - PROBLEM SELECTOR PLAN 1
2/4 sirs criteria (fever, leukocytosis - though, of note, wbc close to baseline), likely source uti  sp ctx 1g in ed, 2l ivf, tylenol  uti sx x2+ weeks, recently completed treatment w macrobid outpatient  bcx collected in ed +gram negative rods, E.coli - likely source genitourinary tract  CTAP 10/26 showing pyelonephritis & cystitis    - continue ctx 2g qd for now (ordered for 8d course), narrow as appropriate  - follow up ucx, bcx sensitivities  - hold home nifedipine for now  - f/u repeat surveillance cultures 10/27 2/4 sirs criteria (fever, leukocytosis - though, of note, wbc close to baseline), likely source uti  sp ctx 1g in ed, 2l ivf, tylenol  uti sx x2+ weeks, recently completed treatment w macrobid outpatient  bcx collected in ed +gram negative rods, E.coli - likely source genitourinary tract  CTAP 10/26 showing pyelonephritis & cystitis    - continue ctx 2g qd for now, plan to discharge on Augmentin to complete full 10-day course  - follow up ucx, bcx sensitivities  - hold home nifedipine for now  - f/u repeat surveillance cultures 10/27

## 2024-10-28 NOTE — DIETITIAN INITIAL EVALUATION ADULT - OTHER CALCULATIONS
*Using current weight as it is within % ideal body weight. Needs adjusted for sepsis, older age, cancer. ideal body weight: 130 pounds; % ideal body weight: 108%

## 2024-10-28 NOTE — DIETITIAN INITIAL EVALUATION ADULT - ADD RECOMMEND
1. Continue with DASH/TLC diet  - Encourage adequate PO and protein intake   - Honor food preferences, as medically able  2. Recommend Ensure max 1x/day to optimize PO intake  - Provides 150 kcal, 30 g pro per serving  3. Appreciate weekly weight trends  4. Continue with current bowel regimen, prn 1. Continue with DASH/TLC diet  - Encourage adequate PO and protein intake   - Honor food preferences, as medically able  2. Recommend Ensure max 1x/day to optimize PO intake  - Provides 150 kcal, 30 g pro per serving  3. Appreciate weekly weight trends  4. Continue with current bowel regimen, prn  5. Monitor lytes, specifically Mg and Phos, replete prn

## 2024-10-28 NOTE — PROGRESS NOTE ADULT - SUBJECTIVE AND OBJECTIVE BOX
***INCOMPLETE NOTE*** Patient is a 79y old  Female who presents with a chief complaint of UTI (28 Oct 2024 12:55)       INTERVAL HPI/OVERNIGHT EVENTS: No acute events overnight.    SUBJECTIVE: Patient seen and examined this morning. She denies fevers/chills, chest pain, shortness of breath, abdominal pain, nausea/vomiting, urinary discomfort/frequency, and diarrhea.     All other review of systems negative except otherwise noted in HPI above.    MEDICATIONS  (STANDING):  cefTRIAXone   IVPB 2000 milliGRAM(s) IV Intermittent every 24 hours  cyanocobalamin 1000 MICROGram(s) Oral every 24 hours  folic acid 1 milliGRAM(s) Oral every 24 hours  hydroxyurea 500 milliGRAM(s) Oral two times a day  levothyroxine 88 MICROGram(s) Oral daily  melatonin 3 milliGRAM(s) Oral at bedtime  metoprolol tartrate 100 milliGRAM(s) Oral daily  polyethylene glycol 3350 17 Gram(s) Oral two times a day  potassium phosphate / sodium phosphate Powder (PHOS-NaK) 1 Packet(s) Oral once  QUEtiapine 25 milliGRAM(s) Oral at bedtime  rivaroxaban 15 milliGRAM(s) Oral with dinner  senna 1 Tablet(s) Oral two times a day    MEDICATIONS  (PRN):  acetaminophen     Tablet .. 1000 milliGRAM(s) Oral every 8 hours PRN Temp greater or equal to 38C (100.4F), Mild Pain (1 - 3)  aluminum hydroxide/magnesium hydroxide/simethicone Suspension 30 milliLiter(s) Oral every 4 hours PRN Dyspepsia  ondansetron Injectable 4 milliGRAM(s) IV Push every 8 hours PRN Nausea and/or Vomiting    Allergies    sulfa drugs (Hives)    Intolerances      Vital Signs Last 24 Hrs  T(C): 36.3 (28 Oct 2024 15:59), Max: 36.6 (28 Oct 2024 05:56)  T(F): 97.4 (28 Oct 2024 15:59), Max: 97.9 (28 Oct 2024 05:56)  HR: 76 (28 Oct 2024 15:59) (76 - 96)  BP: 150/78 (28 Oct 2024 15:59) (147/88 - 150/78)  BP(mean): --  RR: 18 (28 Oct 2024 15:59) (17 - 18)  SpO2: 95% (28 Oct 2024 15:59) (92% - 95%)    Parameters below as of 28 Oct 2024 15:59  Patient On (Oxygen Delivery Method): room air      PHYSICAL EXAM:  Constitutional - NAD, Comfortable  HEENT - NCAT, EOMI  Neck - No limited ROM  Chest - Breathing comfortably on room air, CTAB   Cardio - Warm and well perfused, RRR  Abdomen -  Soft, non-distended, (+)mild pressure with palpation throughout. (+)Hyperactive bowel sounds  Extremities - No peripheral edema, No calf tenderness   Neurologic - Awake and alert. Speaking fluently  Psychiatric - Mood stable, Affect WNL    LABS:                        12.4   7.46  )-----------( 241      ( 28 Oct 2024 12:00 )             36.3     28 Oct 2024 12:00    132    |  100    |  9      ----------------------------<  169    3.7     |  21     |  1.03     Ca    8.1        28 Oct 2024 12:00  Phos  2.2       28 Oct 2024 12:00  Mg     1.8       28 Oct 2024 12:00        CAPILLARY BLOOD GLUCOSE        BLOOD CULTURE  10-25 @ 10:39   Growth in aerobic and anaerobic bottles: Escherichia coli  See previous culture [id number]  --  Blood Culture PCR    RADIOLOGY & ADDITIONAL TESTS: Reviewed

## 2024-10-28 NOTE — DISCHARGE NOTE PROVIDER - NSDCCPCAREPLAN_GEN_ALL_CORE_FT
PRINCIPAL DISCHARGE DIAGNOSIS  Diagnosis: Acute sepsis  Assessment and Plan of Treatment: You were admitted to [facility] because you were in a state of sepsis due to a urinary tract infection. Sepsis is a serious condition in which the body responds improperly to an infection. The infection-fighting processes turn on the body, causing the organs to work poorly.  In the hospital, you received antibiotics through an IV. You will transition to an oral antibiotic medication when you leave the hospital. After discharge from the hospital, please do the followin) Complete the remainder of your antibiotic course by taking Augmentin 875-125mg twice daily for 8 days (10/29 - )  2) Continue to take all your medications as prescribed.   3) Please follow up with your primary care physician, Dr. Bel Staton, within 1 week.     PRINCIPAL DISCHARGE DIAGNOSIS  Diagnosis: Acute sepsis  Assessment and Plan of Treatment: You were admitted to [facility] because you were in a state of sepsis due to a urinary tract infection. Sepsis is a serious condition in which the body responds improperly to an infection. The infection-fighting processes turn on the body, causing the organs to work poorly.  In the hospital, you received antibiotics through an IV. You will transition to an oral antibiotic medication when you leave the hospital. After discharge from the hospital, please do the followin) Complete the remainder of your antibiotic course by taking Augmentin 875-125mg twice daily for 8 days (10/29 - )  2) Continue to take all your medications as prescribed.   3) Please follow up with your primary care physician, Dr. Bel Staton, tomorrow 10/29 at 1:00pm.     PRINCIPAL DISCHARGE DIAGNOSIS  Diagnosis: Acute sepsis  Assessment and Plan of Treatment: You were admitted to [facility] because you were in a state of sepsis due to a urinary tract infection. Sepsis is a serious condition in which the body responds improperly to an infection. The infection-fighting processes turn on the body, causing the organs to work poorly.  In the hospital, you received antibiotics through an IV. You will transition to an oral antibiotic medication when you leave the hospital. After discharge from the hospital, please do the followin) Complete the remainder of your antibiotic course by taking Augmentin 875-125mg twice daily for 7 days (10/30 - )  2) Continue to take all your medications as prescribed.   3) Please follow up with your primary care physician, Dr. Bel Staton, on 10/29 at 1:00pm.

## 2024-10-28 NOTE — DISCHARGE NOTE PROVIDER - NSDCFUSCHEDAPPT_GEN_ALL_CORE_FT
[facility]  NEUROLOGY [address] [facility]  Scheduled Appointment: 11/25/2024

## 2024-10-28 NOTE — DIETITIAN INITIAL EVALUATION ADULT - PROBLEM SELECTOR PLAN 9
mcv 110.6 at presentation, baseline 90  hx of mds, hypothyroidism, elderly w dementia & possible nutrient deficiency    - follow up am tsh, b12, folate

## 2024-10-28 NOTE — PROGRESS NOTE ADULT - ATTENDING COMMENTS
79 year old woman admitted to the hospital with urinary symptoms     she is alert and awake , oriented to person and place   no focal neuro deficits   lungs clear   abd soft , no cva tenderness     Labs , Imaging independently reviewed and interpreted     Impression and plan   # Sepsis   # E Coli Bacteremia   # Complicated Urinary Tract Infection   # Chronic Afib   # HTN   # Hypothyroidism   # Anemia due to Myelodysplastic Syndrome   # Dementia , at baseline she is AAOX2, Has 24/7 HHA   # GUERRERO on CKD stage 3     - IV Ceftriaxone 2mg q24 hours , F/u CT A/p , F/u Urine and Blood cx   - Restart Metoprolol , hold if BP < 100/60   - Hold Nifedipine   - Continue Hydroxyurea , Levothyroxine   - Continue Xarelto
79F with history of dementia, AF on Xarelto, HTN, JAK2+ MDS, who presented for fever and fatigue found to have E coli bacteremia 2/2 pyelonephritis. Treated with CTX with significant improvement. Afebrile and hemodynamically stable, BCx with pan-sensitive E coli. Planned for discharge 10/29 however issues with transportation and so is delayed. HDS, well-appearing, GUERRERO resolved.
79 year old woman admitted to the hospital with urinary symptoms     she is alert and awake , oriented to person and place   no focal neuro deficits   lungs clear   abd soft , no cva tenderness     Labs , Imaging independently reviewed and interpreted     Impression and plan   # Sepsis   # E Coli Bacteremia   # Acute bilateral Pyelonephritis   # Chronic Afib   # HTN   # Hypothyroidism   # Anemia due to Myelodysplastic Syndrome   # Dementia , at baseline she is AAOX2, Has 24/7 HHA   # GUERRERO on CKD stage 3     - IV Ceftriaxone 2mg q24 hours , F/u Urine and Blood cx , CT A /p with large stool burden and Pyelonephritis   - Miralax , Senna , Dulcolax , if no BM by tomorrow , consider ENema   - Restart Metoprolol , hold if BP < 100/60   - Hold Nifedipine   - Continue Hydroxyurea , Levothyroxine   - Continue Xarelto.

## 2024-10-28 NOTE — DIETITIAN INITIAL EVALUATION ADULT - PROBLEM SELECTOR PLAN 4
cr at presentation 1.69, baseline around 1, likely prerenal vs less-likely intrinsic due to pyelonephritis  no cva tenderness on exam    - maintenance fluids at 100cc/h x10 hours (to complete 10/26 am)  - continue to trend creatinine, avoid nephrotoxins, renally dose medications  - follow up ctap as above (avoid iv contrast, consider if clarita improved in am & study still pending)

## 2024-10-28 NOTE — DIETITIAN INITIAL EVALUATION ADULT - PERTINENT LABORATORY DATA
10-27    130[L]  |  101  |  12  ----------------------------<  117[H]  3.9   |  19[L]  |  1.09    Ca    8.2[L]      27 Oct 2024 07:48  Phos  2.3     10-27  Mg     1.9     10-27    TPro  5.9[L]  /  Alb  3.0[L]  /  TBili  0.3  /  DBili  x   /  AST  16  /  ALT  7[L]  /  AlkPhos  88  10-27

## 2024-10-28 NOTE — DIETITIAN INITIAL EVALUATION ADULT - OTHER INFO
Per H&P: 79F PMHx dementia, afib, htn, hypothyroidism, jak2 mds, who presented to ed w [facility]ha after pt was noted to be febrile at home (not measured). per hha & hcp discussions w ed provider, pt was diagnosed w uti few weeks ago, sp tx w macrobid. pt herself is unable to communicate sx due to baseline mental status iso dementia. hha reports noting increased fatigue & urinary frequency in the day preceding admission, sx similar to prior instance of uti, no obvious complaints of discomfort.    Met with pt this AM at bedside. Pt was seated upright and able to answer basic nutrition questions. RD attempted to call emergency contact on file, however no answer. No known food allergies nor food intolerances reported. Pt reported good appetite at baseline, which persists, however RD observed 0% breakfast intake. Pt denied chewing/swallowing difficulties and nausea/vomiting/diarrhea/constipation, could not recall last BM (no BM in RN flowsheets). Pt reported usual body weight 120 pounds (consistent with previous RD note in 3/25/24), denied any recent wt loss/gain. Height obtained from previous RD note (5'6"). Reviewed DASH/TLC diet with patient. Nutrition focused physical exam did not reveal any overt signs of muscle or subcutaneous fat wasting. Pt at high risk for malnutrition, however does not meet ASPEN criteria at this time.    *Note: Labs 10/27  - Na 130 mmol/L  - Phos 2.3 mg/dL   - Corrected Ca: ([4 - 3.0] x 0.8) + 8.2 = 9 mg/dL (WNL)

## 2024-10-28 NOTE — DISCHARGE NOTE NURSING/CASE MANAGEMENT/SOCIAL WORK - NSDCVIVACCINE_GEN_ALL_CORE_FT
influenza, injectable, quadrivalent, preservative free; 11-Sep-2015 08:28; Aleta Guo (RN); [employer]; [id number]; IntraMuscular; Deltoid Left.; 0.5 milliLiter(s); VIS (VIS Published: 19-Aug-2014, VIS Presented: 11-Sep-2015);   Tdap; 22-Dec-2019 02:26; Sebastián Garrett (KEDAR); [employer]; [id number] (Exp. Date: 22-Oct-2021); IntraMuscular; Deltoid Left.; 0.5 milliLiter(s); VIS (VIS Published: 09-May-2013, VIS Presented: 22-Dec-2019);

## 2024-10-28 NOTE — DISCHARGE NOTE NURSING/CASE MANAGEMENT/SOCIAL WORK - PATIENT PORTAL LINK FT
You can access the FollowMyHealth Patient Portal offered by [facility] by registering at the following website: [email or web address]. By joining [employer]’s FollowMyHealth portal, you will also be able to view your health information using other applications (apps) compatible with our system.

## 2024-10-28 NOTE — PROGRESS NOTE ADULT - PROBLEM SELECTOR PLAN 4
cr at presentation 1.69, baseline around 1, likely prerenal vs less-likely intrinsic due to pyelonephritis  no cva tenderness on exam    - s/p maintenance fluids at 100cc/h x10 hours, 1L LR 10/26  - continue to trend creatinine, avoid nephrotoxins, renally dose medications    #Hyponatremia  Patient with hyponatremia s/p fluids. 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 Could be SIADH in the setting of infection. Could also be enhanced by fluids.   - f/u BMP  - repeat urine studies if no improvement 10/28

## 2024-10-28 NOTE — DISCHARGE NOTE PROVIDER - ATTENDING DISCHARGE PHYSICAL EXAMINATION:
******************************************************  ATTENDING ATTESTATION    I have read and agree with the resident Discharge Note above. Patient seen and discussed with resident team on the day of discharge.     Briefly, 79F with history of dementia, AF on Xarelto, HTN, JAK2+ MDS, who presented for fever and fatigue found to have E coli bacteremia 2/2 pyelonephritis. Treated with CTX with significant improvement. Afebrile and hemodynamically stable, BCx with pan-sensitive E coli. Patient is well-appearing, tolerating PO, no significant PEx findings. Discharged to complete 10 days total of Tx for pyelo/bacteremia. GUERRERO resolved with IVF.    Physical Exam:  T(C): 36.6  HR: 96  BP: 147/88  RR: 17  SpO2: 92%    Gen: sitting upright in bed at time of exam, pleasant and conversant  HEENT: NCAT, MMM, clear OP  Neck: supple, trachea at midline  CV: RRR, +S1/S2  Pulm: adequate respiratory effort, no increased work of breathing  Abd: soft, NTND  Skin: warm and dry, no new rashes vs prior report  Ext: WWP  Neuro: no gross focal neurological deficits  Psych: affect and behavior appropriate    I was physically present for the evaluation and management services provided. I agree with the included history, physical, and plan which I reviewed and edited where appropriate. I spent 33 minutes on direct patient care and discharge planning with more than 50% of the visit spent on counseling and/or coordination of care. ******************************************************  ATTENDING ATTESTATION    I have read and agree with the resident Discharge Note above. Patient seen and discussed with resident team on the day of discharge.     Briefly, 79F with history of dementia, AF on Xarelto, HTN, JAK2+ MDS, who presented for fever and fatigue found to have E coli bacteremia 2/2 pyelonephritis. Treated with CTX with significant improvement. Afebrile and hemodynamically stable, BCx with pan-sensitive E coli. Patient is well-appearing, tolerating PO, no significant PEx findings. Discharged to complete 10 days total of Tx for pyelo/bacteremia, discussed with HCP. GUERRERO resolved with IVF.    Physical Exam:  T(C): 36.6  HR: 96  BP: 147/88  RR: 17  SpO2: 92%    Gen: sitting upright in bed at time of exam, pleasant and conversant  HEENT: NCAT, MMM, clear OP  Neck: supple, trachea at midline  CV: RRR, +S1/S2  Pulm: adequate respiratory effort, no increased work of breathing  Abd: soft, NTND  Skin: warm and dry, no new rashes vs prior report  Ext: WWP  Neuro: no gross focal neurological deficits  Psych: affect and behavior appropriate    I was physically present for the evaluation and management services provided. I agree with the included history, physical, and plan which I reviewed and edited where appropriate. I spent 33 minutes on direct patient care and discharge planning with more than 50% of the visit spent on counseling and/or coordination of care.

## 2024-10-28 NOTE — DISCHARGE NOTE PROVIDER - PROVIDER TOKENS
PROVIDER:[TOKEN:[[id number]],FOLLOWUP:[1 week]] PROVIDER:[TOKEN:[[id number]],SCHEDULEDAPPT:[10/29/2024],SCHEDULEDAPPTTIME:[01:00 PM],ESTABLISHEDPATIENT:[T]]

## 2024-10-28 NOTE — DISCHARGE NOTE NURSING/CASE MANAGEMENT/SOCIAL WORK - FINANCIAL ASSISTANCE
[facility] provides services at a reduced cost to those who are determined to be eligible through [facility]’s financial assistance program. Information regarding [facility]’s financial assistance program can be found by going to [email or web address] or by calling [phone].

## 2024-10-28 NOTE — DISCHARGE NOTE PROVIDER - CARE PROVIDER_API CALL
Bel Staton  Internal Medicine  [address]  [place] [address]  Phone: [phone]  Fax: [phone]  Follow Up Time: 1 week   Bel Staton  Internal Medicine  [address]  [place] [address]  Phone: [phone]  Fax: [phone]  Established Patient  Scheduled Appointment: 10/29/2024 01:00 PM

## 2024-10-28 NOTE — DISCHARGE NOTE PROVIDER - NSDCMRMEDTOKEN_GEN_ALL_CORE_FT
acetaminophen 500 mg oral tablet: 2 tab(s) orally every 8 hours as needed for pain/fever  amoxicillin-clavulanate 875 mg-125 mg oral tablet: 875 milligram(s) orally every 12 hours starting from 10/29/2024.  folic acid 1 mg oral tablet: 1 tab(s) orally once a day  Home Physical Therapy: Please perform as instructed  hydroxyurea 500 mg oral capsule: 15 mg/kg orally 2 times a day  levothyroxine 88 mcg (0.088 mg) oral capsule: 1 cap(s) orally once a day  melatonin 3 mg oral tablet: 1 tab(s) orally once a day (at bedtime)  Metoprolol Tartrate 100 mg oral tablet: 1 tab(s) orally once a day  NIFEdipine (Eqv-Procardia XL) 30 mg oral tablet, extended release: 1 tab(s) orally once a day  polyethylene glycol 3350 oral powder for reconstitution: 17 gram(s) orally once a day  rivaroxaban 15 mg oral tablet: 1 tab(s) orally once a day (at bedtime)  Senna 8.6 mg oral tablet: 2 tab(s) orally once a day (at bedtime)  Seroquel 25 mg oral tablet: 1 tab(s) orally once a day

## 2024-10-28 NOTE — DISCHARGE NOTE NURSING/CASE MANAGEMENT/SOCIAL WORK - NSDCPEPTCAREGIVEDUMATLIST _GEN_ALL_CORE
Date:January 15, 2022      Patient was self referred, no letter generated. Do not send.        [facility] Health Information       Rivaroxaban/Xarelto

## 2024-10-28 NOTE — DISCHARGE NOTE PROVIDER - HOSPITAL COURSE
79F PMHx dementia, afib, htn, hypothyroidism, jak2 mds, who presented to ed w [facility] after pt was noted to be febrile at home (not measured). per hha & hcp discussions w ed provider, pt was diagnosed w uti few weeks ago, sp tx w po macrobid. pt herself is unable to communicate sx due to baseline mental status iso dementia. hha reports noting increased fatigue & urinary frequency in the day preceding admission, sx similar to prior instance of uti, no obvious complaints of discomfort.    Hospital course (by problem):     1. Sepsis.   #Bacteremia  #Acute UTI  ·  Plan: 2/4 sirs criteria (fever, leukocytosis - though, of note, wbc close to baseline), likely source uti. S/p ctx 1g in ed, 2l ivf, tylenol. UTI sx x2+ weeks, recently completed treatment w macrobid outpatient. BCx collected in ed +gram negative rods, E.coli - likely source genitourinary tract. CTAP 10/26 showing pyelonephritis & cystitis  BCx 10/25 - E. Coli, torrez-sensitive  CTX 2g QD (10/27-10/28)  - Upon discharge, complete 10-day antibiotic course with Augmentin 875-125mg (10/29 - 11/7)    2. Constipation  Patient with significant stool burden on CT. Could have obstruction given large stool burden which could have contributed to her UTI.   - Inpatient bowel regimen: Miralax BID, Senna BID, Dulcolax suppository  - Upon discharge, continue Miralax QD, Senna 2 tabs QHS    3. GUERRERO (acute kidney injury).   ·  Plan: cr at presentation 1.69, baseline around 1, likely prerenal vs less-likely intrinsic due to pyelonephritis. No CVA tenderness on exam  S/p maintenance fluids at 100cc/h x10 hours, 1L LR 10/26    4. Hyponatremia  Patient with hyponatremia s/p fluids. 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 Could be SIADH in the setting of infection. Could also be enhanced by fluids.   Improved to 132 without intervention    5. Chronic atrial fibrillation.   ·  Plan: home medication(s): Xarelto 15mg qd, metoprolol tartrate 100qd  - Continue home Xarelto and Metoprolol    6. HTN (hypertension).   ·  Plan: home medication(s): metoprolol tartrate 100mg qd, nifedipine 30mg qd  - Home Nifedipine held initially given acute sepsis. Okay to resume upon discharge  - Restarted home metoprolol given afib w/ intermittent rvr.    7. Hypothyroidism.   ·  Plan: home medication(s): levothyroxine 88mcg - 1 tab tuesday - friday, sunday; 2 tabs monday & saturday  - continue home medication(s).    8. Myelodysplastic syndrome.   ·  Plan: Jak2+ myleoproliferative syndrome. Follows w Dr. Yadav every 6mo, recently resumed hydroxyurea. Chronic leukocytosis, thrombocytosis (plts wnl at presentation)  Home medication(s): hydroxyurea 500mg bid  - continue home medication(s).    9. Macrocytosis.   ·  Plan: mcv 110.6 at presentation, baseline 90. TSH normal. Folate low. VitB12 low normal. Hx of mds, hypothyroidism, elderly w dementia & possible nutrient deficiency  - Continue Folic acid 1mg QD    10. Dementia.   ·  Plan; MRI 2022 moderate chronic microvascular ischemic disease, chronic lacunar infarcts in the right centrum semiovale and left cerebellar hemisphere  Behavioral sx worse at night, insomnia, confusion, aggression. 24/7 hha, hcp Maile Nickerson ([phone])  Seroquel qhs dose recently increased from 12.5 to 25 qhs w notable improvement in sx above    Patient was discharged to: Home    New medications: Augmentin, Miralax, Senna, Folic acid  Changes to old medications: None  Medications that were stopped: None    Items to follow up as outpatient: PCP    Physical exam at the time of discharge:  Constitutional - NAD, Comfortable  HEENT - NCAT, EOMI  Neck - No limited ROM  Chest - Breathing comfortably on room air, CTAB   Cardio - Warm and well perfused, RRR, no murmur  Abdomen -  Soft, non-distended, , (+)mild discomfort with palpation, (+)hyperactive bowel sounds  Extremities - No peripheral edema, No calf tenderness   Neurologic - Awake and alert. Speaking fluently  Psychiatric - Mood stable, Affect WNL 79F PMHx dementia, afib, htn, hypothyroidism, jak2 mds, who presented to ed w [facility] after pt was noted to be febrile at home (not measured). per hha & hcp discussions w ed provider, pt was diagnosed w uti few weeks ago, sp tx w po macrobid. pt herself is unable to communicate sx due to baseline mental status iso dementia. hha reports noting increased fatigue & urinary frequency in the day preceding admission, sx similar to prior instance of uti, no obvious complaints of discomfort.    Hospital course (by problem):     1. Sepsis.   #Bacteremia  #Acute UTI  ·  Plan: 2/4 sirs criteria (fever, leukocytosis - though, of note, wbc close to baseline), likely source uti. S/p ctx 1g in ed, 2l ivf, tylenol. UTI sx x2+ weeks, recently completed treatment w macrobid outpatient. BCx collected in ed +gram negative rods, E.coli - likely source genitourinary tract. CTAP 10/26 showing pyelonephritis & cystitis  BCx 10/25 - E. Coli, torrez-sensitive  CTX 2g QD (10/27-10/28)  - Upon discharge, complete 10-day antibiotic course with Augmentin 875-125mg (10/29 - 11/7)    2. Constipation  Patient with significant stool burden on CT. Could have obstruction given large stool burden which could have contributed to her UTI.   Inpatient bowel regimen: Miralax BID, Senna BID, Dulcolax suppository  - Upon discharge, continue Miralax QD, Senna 2 tabs QHS    3. GUERRERO (acute kidney injury).   ·  Plan: cr at presentation 1.69, baseline around 1, likely prerenal vs less-likely intrinsic due to pyelonephritis. No CVA tenderness on exam  S/p maintenance fluids at 100cc/h x10 hours, 1L LR 10/26    4. Hyponatremia  Patient with hyponatremia s/p fluids. 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 Could be SIADH in the setting of infection. Could also be enhanced by fluids.   Improved to 132 without intervention    5. Chronic atrial fibrillation.   ·  Plan: home medication(s): Xarelto 15mg qd, metoprolol tartrate 100qd  - Continue home Xarelto and Metoprolol    6. HTN (hypertension).   ·  Plan: home medication(s): metoprolol tartrate 100mg qd, nifedipine 30mg qd  - Home Nifedipine held initially given acute sepsis. Okay to resume upon discharge  - Restarted home metoprolol given afib w/ intermittent rvr.    7. Hypothyroidism.   ·  Plan: home medication(s): levothyroxine 88mcg - 1 tab tuesday - friday, sunday; 2 tabs monday & saturday  - continue home medication(s).    8. Myelodysplastic syndrome.   ·  Plan: Jak2+ myleoproliferative syndrome. Follows w Dr. Yadav every 6mo, recently resumed hydroxyurea. Chronic leukocytosis, thrombocytosis (plts wnl at presentation)  Home medication(s): hydroxyurea 500mg bid  - continue home medication(s).    9. Macrocytosis.   ·  Plan: mcv 110.6 at presentation, baseline 90. TSH normal. Folate low. VitB12 low normal. Hx of mds, hypothyroidism, elderly w dementia & possible nutrient deficiency  - Continue Folic acid 1mg QD    10. Dementia.   ·  Plan; MRI 2022 moderate chronic microvascular ischemic disease, chronic lacunar infarcts in the right centrum semiovale and left cerebellar hemisphere  Behavioral sx worse at night, insomnia, confusion, aggression. 24/7 hha, hcp Mailechula Nickerson ([phone])  Seroquel qhs dose recently increased from 12.5 to 25 qhs w notable improvement in sx above    Patient was discharged to: Home    New medications: Augmentin, Miralax, Senna, Folic acid  Changes to old medications: None  Medications that were stopped: None    Items to follow up as outpatient: PCP    Physical exam at the time of discharge:  Constitutional - NAD, Comfortable  HEENT - NCAT, EOMI  Neck - No limited ROM  Chest - Breathing comfortably on room air, CTAB   Cardio - Warm and well perfused, RRR, no murmur  Abdomen -  Soft, non-distended, , (+)mild discomfort with palpation, (+)hyperactive bowel sounds  Extremities - No peripheral edema, No calf tenderness   Neurologic - Awake and alert. Speaking fluently  Psychiatric - Mood stable, Affect WNL 79F PMHx dementia, afib, htn, hypothyroidism, jak2 mds, who presented to ed w [facility] after pt was noted to be febrile at home (not measured). per hha & hcp discussions w ed provider, pt was diagnosed w uti few weeks ago, sp tx w po macrobid. pt herself is unable to communicate sx due to baseline mental status iso dementia. hha reports noting increased fatigue & urinary frequency in the day preceding admission, sx similar to prior instance of uti, no obvious complaints of discomfort.    Hospital course (by problem):     1. Sepsis.   #Bacteremia  #Acute UTI  ·  Plan: 2/4 sirs criteria (fever, leukocytosis - though, of note, wbc close to baseline), likely source uti. S/p ctx 1g in ed, 2l ivf, tylenol. UTI sx x2+ weeks, recently completed treatment w macrobid outpatient. BCx collected in ed +gram negative rods, E.coli - likely source genitourinary tract. CTAP 10/26 showing pyelonephritis & cystitis  BCx 10/25 - E. Coli, torrez-sensitive  CTX 2g QD (10/27-10/29)  - Upon discharge, complete 10-day antibiotic course with Augmentin 875-125mg (10/30 - 11/7)    2. Constipation  Patient with significant stool burden on CT. Could have obstruction given large stool burden which could have contributed to her UTI.   Inpatient bowel regimen: Miralax BID, Senna BID, Dulcolax suppository  - Upon discharge, continue Miralax QD, Senna 2 tabs QHS    3. GUERRERO (acute kidney injury).   ·  Plan: cr at presentation 1.69, baseline around 1, likely prerenal vs less-likely intrinsic due to pyelonephritis. No CVA tenderness on exam  S/p maintenance fluids at 100cc/h x10 hours, 1L LR 10/26    4. Hyponatremia  Patient with hyponatremia s/p fluids. 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 Could be SIADH in the setting of infection. Could also be enhanced by fluids.   Improved to 132 without intervention    5. Chronic atrial fibrillation.   ·  Plan: home medication(s): Xarelto 15mg qd, metoprolol tartrate 100qd  - Continue home Xarelto and Metoprolol    6. HTN (hypertension).   ·  Plan: home medication(s): metoprolol tartrate 100mg qd, nifedipine 30mg qd  - Home Nifedipine held initially given acute sepsis. Okay to resume upon discharge  - Restarted home metoprolol given afib w/ intermittent rvr.    7. Hypothyroidism.   ·  Plan: home medication(s): levothyroxine 88mcg - 1 tab tuesday - friday, sunday; 2 tabs monday & saturday  - continue home medication(s).    8. Myelodysplastic syndrome.   ·  Plan: Jak2+ myleoproliferative syndrome. Follows w Dr. Yadav every 6mo, recently resumed hydroxyurea. Chronic leukocytosis, thrombocytosis (plts wnl at presentation)  Home medication(s): hydroxyurea 500mg bid  - continue home medication(s).    9. Macrocytosis.   ·  Plan: mcv 110.6 at presentation, baseline 90. TSH normal. Folate low. VitB12 low normal. Hx of mds, hypothyroidism, elderly w dementia & possible nutrient deficiency  - Continue Folic acid 1mg QD    10. Dementia.   ·  Plan; MRI 2022 moderate chronic microvascular ischemic disease, chronic lacunar infarcts in the right centrum semiovale and left cerebellar hemisphere  Behavioral sx worse at night, insomnia, confusion, aggression. 24/7 hha, hcp Mailechula Nickerson ([phone])  Seroquel qhs dose recently increased from 12.5 to 25 qhs w notable improvement in sx above    Patient was discharged to: Home    New medications: Augmentin, Miralax, Senna, Folic acid  Changes to old medications: None  Medications that were stopped: None    Items to follow up as outpatient: PCP    Physical exam at the time of discharge:  Constitutional - NAD, Comfortable  HEENT - NCAT, EOMI  Neck - No limited ROM  Chest - Breathing comfortably on room air, CTAB   Cardio - Warm and well perfused, RRR, no murmur  Abdomen -  Soft, non-distended, , (+)mild discomfort with palpation, (+)hyperactive bowel sounds  Extremities - No peripheral edema, No calf tenderness   Neurologic - Awake and alert. Speaking fluently  Psychiatric - Mood stable, Affect WNL

## 2024-10-28 NOTE — PROGRESS NOTE ADULT - PROBLEM SELECTOR PLAN 10
mri 2022 moderate chronic microvascular ischemic disease, chronic lacunar infarcts in the right enctrum semiovrale and left cerbellar hemisphere  behavioral sx worse at night, insomnia, confusion, aggression  24/7 hha, hcp jesica bourgeois ([phone])  seroquel qhs dose recently increased from 12.5 to 25 qhs w notable improvement in sx above    - continue seroquel 25mg qhs  - delirium precautions (high risk for delirium given advanced age, dementia, cognitive impairment, frailty, history of cns disorders - bedside window with blinds open, frequent re-orientation, minimize lines nighttime awakenings, ensure regular bowel movements w bowel regimen)
mri 2022 moderate chronic microvascular ischemic disease, chronic lacunar infarcts in the right enctrum semiovrale and left cerbellar hemisphere  behavioral sx worse at night, insomnia, confusion, aggression  24/7 hha, hcp jesica bourgeois ([phone])  seroquel qhs dose recently increased from 12.5 to 25 qhs w notable improvement in sx above    - continue seroquel 25mg qhs  - delirium precautions (high risk for delirium given advanced age, dementia, cognitive impairment, frailty, history of cns disorders - bedside window with blinds open, frequent re-orientation, minimize lines nighttime awakenings, ensure regular bowel movements w bowel regimen)
mri 2022 moderate chronic microvascular ischemic disease, chronic lacunar infarcts in the right enctrum semiovrale and left cerbellar hemisphere  behavioral sx worse at night, insomnia, confusion, aggression  24/7 hha, hcp jesica bourgeois ([phone])  seroquel qhs dose recently increased from 12.5 to 25 qhs w notable improvement in sx above    - continue seroquel 25mg qhs  - delirium precautions (high risk for delirium given advanced age, dementia, cognitive impairment, frailty, history of cns disorders - bedside window with blinds open, frequent re-orientation, minimize lines nighttime awakenings, ensure regular bowel movements w bowel regimen)

## 2024-10-28 NOTE — DIETITIAN INITIAL EVALUATION ADULT - PROBLEM SELECTOR PLAN 10
mri 2022 moderate chronic microvascular ischemic disease, chronic lacunar infarcts in the right enctrum semiovrale and left cerbellar hemisphere  behavioral sx worse at night, insomnia, confusion, aggression  24/7 hha, hcp jesica bourgeois ([phone])  seroquel qhs dose recently increased from 12.5 to 25 qhs w notable improvement in sx above    - continue seroquel 25mg qhs  - delirium precautions (high risk for delirium given advanced age, dementia, cognitive impairment, frailty, history of cns disorders - bedside window with blinds open, frequent re-orientation, minimize lines nighttime awakenings, ensure regular bowel movements w bowel regimen)

## 2024-10-29 VITALS
SYSTOLIC BLOOD PRESSURE: 131 MMHG | RESPIRATION RATE: 16 BRPM | HEART RATE: 87 BPM | OXYGEN SATURATION: 95 % | DIASTOLIC BLOOD PRESSURE: 85 MMHG | TEMPERATURE: 98 F

## 2024-10-29 PROBLEM — D46.9 MYELODYSPLASTIC SYNDROME, UNSPECIFIED: Chronic | Status: ACTIVE | Noted: 2024-10-25

## 2024-10-29 PROCEDURE — 99239 HOSP IP/OBS DSCHRG MGMT >30: CPT

## 2024-10-29 RX ADMIN — Medication 1000 MILLIGRAM(S): at 00:26

## 2024-10-29 RX ADMIN — Medication 1 TABLET(S): at 06:26

## 2024-10-29 RX ADMIN — Medication 100 MILLIGRAM(S): at 06:26

## 2024-10-29 RX ADMIN — Medication 500 MILLIGRAM(S): at 06:27

## 2024-10-29 RX ADMIN — FOLIC ACID 1 MILLIGRAM(S): 1 TABLET ORAL at 07:28

## 2024-10-29 RX ADMIN — Medication 88 MICROGRAM(S): at 06:28

## 2024-10-29 RX ADMIN — Medication 1000 MICROGRAM(S): at 07:28

## 2024-10-29 RX ADMIN — Medication 1000 MILLIGRAM(S): at 13:46

## 2024-10-29 RX ADMIN — POLYETHYLENE GLYCOL 3350 17 GRAM(S): 17 POWDER, FOR SOLUTION ORAL at 06:28

## 2024-11-02 LAB
CULTURE RESULTS: SIGNIFICANT CHANGE UP
SPECIMEN SOURCE: SIGNIFICANT CHANGE UP

## 2024-11-05 DIAGNOSIS — E87.1 HYPO-OSMOLALITY AND HYPONATREMIA: ICD-10-CM

## 2024-11-05 DIAGNOSIS — K59.00 CONSTIPATION, UNSPECIFIED: ICD-10-CM

## 2024-11-05 DIAGNOSIS — I48.20 CHRONIC ATRIAL FIBRILLATION, UNSPECIFIED: ICD-10-CM

## 2024-11-05 DIAGNOSIS — A41.51 SEPSIS DUE TO ESCHERICHIA COLI [E. COLI]: ICD-10-CM

## 2024-11-05 DIAGNOSIS — N39.0 URINARY TRACT INFECTION, SITE NOT SPECIFIED: ICD-10-CM

## 2024-11-05 DIAGNOSIS — N17.9 ACUTE KIDNEY FAILURE, UNSPECIFIED: ICD-10-CM

## 2024-11-05 DIAGNOSIS — Z88.2 ALLERGY STATUS TO SULFONAMIDES: ICD-10-CM

## 2024-11-05 DIAGNOSIS — E03.9 HYPOTHYROIDISM, UNSPECIFIED: ICD-10-CM

## 2024-11-05 DIAGNOSIS — Z79.890 HORMONE REPLACEMENT THERAPY: ICD-10-CM

## 2024-11-05 DIAGNOSIS — D46.9 MYELODYSPLASTIC SYNDROME, UNSPECIFIED: ICD-10-CM

## 2024-11-05 DIAGNOSIS — Z79.82 LONG TERM (CURRENT) USE OF ASPIRIN: ICD-10-CM

## 2024-11-05 DIAGNOSIS — N18.30 CHRONIC KIDNEY DISEASE, STAGE 3 UNSPECIFIED: ICD-10-CM

## 2024-11-05 DIAGNOSIS — I12.9 HYPERTENSIVE CHRONIC KIDNEY DISEASE WITH STAGE 1 THROUGH STAGE 4 CHRONIC KIDNEY DISEASE, OR UNSPECIFIED CHRONIC KIDNEY DISEASE: ICD-10-CM

## 2024-11-26 PROCEDURE — 83605 ASSAY OF LACTIC ACID: CPT

## 2024-11-26 PROCEDURE — 82746 ASSAY OF FOLIC ACID SERUM: CPT

## 2024-11-26 PROCEDURE — 83935 ASSAY OF URINE OSMOLALITY: CPT

## 2024-11-26 PROCEDURE — 87077 CULTURE AEROBIC IDENTIFY: CPT

## 2024-11-26 PROCEDURE — 96361 HYDRATE IV INFUSION ADD-ON: CPT

## 2024-11-26 PROCEDURE — 71045 X-RAY EXAM CHEST 1 VIEW: CPT

## 2024-11-26 PROCEDURE — 84443 ASSAY THYROID STIM HORMONE: CPT

## 2024-11-26 PROCEDURE — 85025 COMPLETE CBC W/AUTO DIFF WBC: CPT

## 2024-11-26 PROCEDURE — 83735 ASSAY OF MAGNESIUM: CPT

## 2024-11-26 PROCEDURE — 99285 EMERGENCY DEPT VISIT HI MDM: CPT

## 2024-11-26 PROCEDURE — 36415 COLL VENOUS BLD VENIPUNCTURE: CPT

## 2024-11-26 PROCEDURE — 84133 ASSAY OF URINE POTASSIUM: CPT

## 2024-11-26 PROCEDURE — 97161 PT EVAL LOW COMPLEX 20 MIN: CPT

## 2024-11-26 PROCEDURE — 96365 THER/PROPH/DIAG IV INF INIT: CPT

## 2024-11-26 PROCEDURE — 97165 OT EVAL LOW COMPLEX 30 MIN: CPT

## 2024-11-26 PROCEDURE — 84300 ASSAY OF URINE SODIUM: CPT

## 2024-11-26 PROCEDURE — 80053 COMPREHEN METABOLIC PANEL: CPT

## 2024-11-26 PROCEDURE — 82570 ASSAY OF URINE CREATININE: CPT

## 2024-11-26 PROCEDURE — 82607 VITAMIN B-12: CPT

## 2024-11-26 PROCEDURE — 84540 ASSAY OF URINE/UREA-N: CPT

## 2024-11-26 PROCEDURE — 74177 CT ABD & PELVIS W/CONTRAST: CPT | Mod: MC

## 2024-11-26 PROCEDURE — 87086 URINE CULTURE/COLONY COUNT: CPT

## 2024-11-26 PROCEDURE — 87186 SC STD MICRODIL/AGAR DIL: CPT

## 2024-11-26 PROCEDURE — 87150 DNA/RNA AMPLIFIED PROBE: CPT

## 2024-11-26 PROCEDURE — 84156 ASSAY OF PROTEIN URINE: CPT

## 2024-11-26 PROCEDURE — 87040 BLOOD CULTURE FOR BACTERIA: CPT

## 2024-11-26 PROCEDURE — 84100 ASSAY OF PHOSPHORUS: CPT

## 2024-11-26 PROCEDURE — 80048 BASIC METABOLIC PNL TOTAL CA: CPT

## 2024-11-26 PROCEDURE — 81001 URINALYSIS AUTO W/SCOPE: CPT

## 2024-12-18 ENCOUNTER — APPOINTMENT (OUTPATIENT)
Dept: NEUROLOGY | Facility: CLINIC | Age: 79
End: 2024-12-18
Payer: MEDICARE

## 2024-12-18 ENCOUNTER — APPOINTMENT (OUTPATIENT)
Dept: NEUROLOGY | Facility: CLINIC | Age: 79
End: 2024-12-18

## 2024-12-18 PROCEDURE — 99214 OFFICE O/P EST MOD 30 MIN: CPT

## 2025-02-10 NOTE — ED ADULT NURSE NOTE - AS SC BRADEN ACTIVITY
AMINA HERE FOR FOLLOW UP & TX   Tx today   Ct scan before rv   CT: 2/24/25 @ 10AM ARRIVAL @ 9:45AM   MD VISIT: 3/3/25 @ 11:15AM TX @ 11AM   AVS PRINTED AND GIVEN ON EXIT    (2) chairfast

## 2025-03-03 ENCOUNTER — RX RENEWAL (OUTPATIENT)
Age: 80
End: 2025-03-03

## 2025-04-08 ENCOUNTER — APPOINTMENT (OUTPATIENT)
Dept: NEUROLOGY | Age: 80
End: 2025-04-08
Payer: MEDICARE

## 2025-04-08 ENCOUNTER — NON-APPOINTMENT (OUTPATIENT)
Age: 80
End: 2025-04-08

## 2025-04-08 VITALS
WEIGHT: 145 LBS | HEART RATE: 77 BPM | RESPIRATION RATE: 17 BRPM | HEIGHT: 66 IN | DIASTOLIC BLOOD PRESSURE: 64 MMHG | OXYGEN SATURATION: 97 % | BODY MASS INDEX: 23.3 KG/M2 | TEMPERATURE: 97.6 F | SYSTOLIC BLOOD PRESSURE: 105 MMHG

## 2025-04-08 PROCEDURE — 99215 OFFICE O/P EST HI 40 MIN: CPT

## 2025-04-08 PROCEDURE — G2211 COMPLEX E/M VISIT ADD ON: CPT

## 2025-04-08 RX ORDER — QUETIAPINE FUMARATE 50 MG/1
50 TABLET ORAL AT BEDTIME
Qty: 90 | Refills: 3 | Status: ACTIVE | COMMUNITY
Start: 2025-04-08 | End: 1900-01-01

## 2025-04-08 RX ORDER — GABAPENTIN 100 MG/1
100 CAPSULE ORAL
Refills: 0 | Status: ACTIVE | COMMUNITY

## 2025-04-08 RX ORDER — FOLIC ACID 1 MG/1
1 TABLET ORAL DAILY
Refills: 0 | Status: ACTIVE | COMMUNITY

## 2025-04-08 RX ORDER — ACETAMINOPHEN 500 MG
500 TABLET ORAL DAILY
Refills: 0 | Status: ACTIVE | COMMUNITY

## 2025-04-08 RX ORDER — HYDROXYUREA 500 MG/1
500 CAPSULE ORAL TWICE DAILY
Refills: 0 | Status: ACTIVE | COMMUNITY

## 2025-04-08 RX ORDER — SENNOSIDES 8.6 MG TABLETS 8.6 MG/1
8.6 TABLET ORAL
Refills: 0 | Status: ACTIVE | COMMUNITY

## 2025-04-23 RX ORDER — QUETIAPINE FUMARATE 50 MG/1
50 TABLET ORAL AT BEDTIME
Qty: 90 | Refills: 3 | Status: ACTIVE | COMMUNITY
Start: 2025-04-23 | End: 1900-01-01

## 2025-05-27 ENCOUNTER — APPOINTMENT (OUTPATIENT)
Dept: NEUROLOGY | Age: 80
End: 2025-05-27

## 2025-06-10 ENCOUNTER — OUTPATIENT (OUTPATIENT)
Dept: OUTPATIENT SERVICES | Facility: HOSPITAL | Age: 80
LOS: 1 days | End: 2025-06-10

## 2025-06-10 ENCOUNTER — APPOINTMENT (OUTPATIENT)
Dept: MRI IMAGING | Facility: CLINIC | Age: 80
End: 2025-06-10
Payer: MEDICARE

## 2025-06-10 PROCEDURE — 70551 MRI BRAIN STEM W/O DYE: CPT | Mod: 26

## 2025-06-10 PROCEDURE — 76377 3D RENDER W/INTRP POSTPROCES: CPT | Mod: 26

## 2025-07-29 ENCOUNTER — APPOINTMENT (OUTPATIENT)
Dept: NEUROLOGY | Age: 80
End: 2025-07-29
Payer: MEDICARE

## 2025-07-29 ENCOUNTER — TRANSCRIPTION ENCOUNTER (OUTPATIENT)
Age: 80
End: 2025-07-29

## 2025-07-29 ENCOUNTER — NON-APPOINTMENT (OUTPATIENT)
Age: 80
End: 2025-07-29

## 2025-07-29 VITALS
RESPIRATION RATE: 17 BRPM | DIASTOLIC BLOOD PRESSURE: 70 MMHG | SYSTOLIC BLOOD PRESSURE: 105 MMHG | TEMPERATURE: 98.1 F | WEIGHT: 132 LBS | BODY MASS INDEX: 21.21 KG/M2 | HEART RATE: 79 BPM | OXYGEN SATURATION: 98 % | HEIGHT: 66 IN

## 2025-07-29 DIAGNOSIS — G47.00 INSOMNIA, UNSPECIFIED: ICD-10-CM

## 2025-07-29 DIAGNOSIS — F02.818 ALZHEIMER'S DISEASE, UNSPECIFIED: ICD-10-CM

## 2025-07-29 DIAGNOSIS — F01.518 ALZHEIMER'S DISEASE, UNSPECIFIED: ICD-10-CM

## 2025-07-29 DIAGNOSIS — G30.9 ALZHEIMER'S DISEASE, UNSPECIFIED: ICD-10-CM

## 2025-07-29 PROCEDURE — 99215 OFFICE O/P EST HI 40 MIN: CPT

## 2025-07-29 RX ORDER — QUETIAPINE FUMARATE 25 MG/1
25 TABLET ORAL AT BEDTIME
Qty: 90 | Refills: 2 | Status: ACTIVE | COMMUNITY
Start: 2025-07-29 | End: 1900-01-01

## 2025-07-29 RX ORDER — GLUCOSAMINE HCL/CHONDROITIN SU 500-400 MG
3 CAPSULE ORAL AT BEDTIME
Qty: 90 | Refills: 2 | Status: ACTIVE | COMMUNITY
Start: 2025-07-29 | End: 1900-01-01

## 2025-08-11 ENCOUNTER — TRANSCRIPTION ENCOUNTER (OUTPATIENT)
Age: 80
End: 2025-08-11